# Patient Record
Sex: FEMALE | Race: WHITE | Employment: OTHER | URBAN - METROPOLITAN AREA
[De-identification: names, ages, dates, MRNs, and addresses within clinical notes are randomized per-mention and may not be internally consistent; named-entity substitution may affect disease eponyms.]

---

## 2018-07-09 ENCOUNTER — HOSPITAL ENCOUNTER (OUTPATIENT)
Dept: SURGERY | Age: 73
Discharge: HOME | End: 2018-07-09
Attending: OPHTHALMOLOGY | Admitting: OPHTHALMOLOGY

## 2018-07-09 VITALS — DIASTOLIC BLOOD PRESSURE: 68 MMHG | SYSTOLIC BLOOD PRESSURE: 134 MMHG

## 2018-07-09 RX ORDER — TROPICAMIDE 10 MG/ML
1 SOLUTION/ DROPS OPHTHALMIC ONCE
Status: COMPLETED | OUTPATIENT
Start: 2018-07-09 | End: 2018-07-09

## 2018-07-09 RX ORDER — PROPARACAINE HYDROCHLORIDE 5 MG/ML
1 SOLUTION/ DROPS OPHTHALMIC ONCE
Status: COMPLETED | OUTPATIENT
Start: 2018-07-09 | End: 2018-07-09

## 2018-07-09 RX ADMIN — TROPICAMIDE 1 DROP: 10 SOLUTION/ DROPS OPHTHALMIC at 11:52

## 2018-07-09 RX ADMIN — PROPARACAINE HYDROCHLORIDE 1 DROP: 5 SOLUTION/ DROPS OPHTHALMIC at 11:49

## 2021-03-12 ENCOUNTER — NURSE ONLY (OUTPATIENT)
Dept: PRIMARY CARE CLINIC | Age: 76
End: 2021-03-12
Payer: MEDICARE

## 2021-03-12 DIAGNOSIS — Z23 HIGH PRIORITY FOR COVID-19 VIRUS VACCINATION: Primary | ICD-10-CM

## 2021-03-12 PROCEDURE — 91300 COVID-19, PFIZER VACCINE 30MCG/0.3ML DOSE: CPT | Performed by: NURSE PRACTITIONER

## 2021-03-12 PROCEDURE — 0001A COVID-19, PFIZER VACCINE 30MCG/0.3ML DOSE: CPT | Performed by: NURSE PRACTITIONER

## 2021-04-08 ENCOUNTER — NURSE ONLY (OUTPATIENT)
Dept: PRIMARY CARE CLINIC | Age: 76
End: 2021-04-08

## 2021-04-08 DIAGNOSIS — Z23 HIGH PRIORITY FOR COVID-19 VIRUS VACCINATION: Primary | ICD-10-CM

## 2022-04-16 ENCOUNTER — APPOINTMENT (OUTPATIENT)
Dept: CT IMAGING | Age: 77
End: 2022-04-16
Payer: MEDICARE

## 2022-04-16 ENCOUNTER — HOSPITAL ENCOUNTER (EMERGENCY)
Age: 77
Discharge: HOME | End: 2022-04-16
Payer: MEDICARE

## 2022-04-16 VITALS
RESPIRATION RATE: 18 BRPM | HEIGHT: 66 IN | TEMPERATURE: 98.2 F | HEART RATE: 80 BPM | WEIGHT: 201.06 LBS | SYSTOLIC BLOOD PRESSURE: 135 MMHG | BODY MASS INDEX: 32.31 KG/M2 | DIASTOLIC BLOOD PRESSURE: 70 MMHG | OXYGEN SATURATION: 97 %

## 2022-04-16 DIAGNOSIS — M54.42 CHRONIC LEFT-SIDED LOW BACK PAIN WITH LEFT-SIDED SCIATICA: Primary | ICD-10-CM

## 2022-04-16 DIAGNOSIS — N28.89 LEFT RENAL MASS: ICD-10-CM

## 2022-04-16 DIAGNOSIS — G89.29 CHRONIC LEFT-SIDED LOW BACK PAIN WITH LEFT-SIDED SCIATICA: Primary | ICD-10-CM

## 2022-04-16 LAB
ANION GAP SERPL CALCULATED.3IONS-SCNC: 16 MMOL/L (ref 3–16)
BASOPHILS ABSOLUTE: 0.1 K/UL (ref 0–0.2)
BASOPHILS RELATIVE PERCENT: 0.6 %
BILIRUBIN URINE: NEGATIVE
BLOOD, URINE: ABNORMAL
BUN BLDV-MCNC: 11 MG/DL (ref 7–20)
CALCIUM SERPL-MCNC: 9.6 MG/DL (ref 8.3–10.6)
CHLORIDE BLD-SCNC: 98 MMOL/L (ref 99–110)
CLARITY: CLEAR
CO2: 23 MMOL/L (ref 21–32)
COLOR: YELLOW
CREAT SERPL-MCNC: 0.9 MG/DL (ref 0.6–1.2)
EOSINOPHILS ABSOLUTE: 0 K/UL (ref 0–0.6)
EOSINOPHILS RELATIVE PERCENT: 0.2 %
EPITHELIAL CELLS, UA: 2 /HPF (ref 0–5)
GFR AFRICAN AMERICAN: >60
GFR NON-AFRICAN AMERICAN: >60
GLUCOSE BLD-MCNC: 128 MG/DL (ref 70–99)
GLUCOSE URINE: NEGATIVE MG/DL
HCT VFR BLD CALC: 35.1 % (ref 36–48)
HEMOGLOBIN: 11.9 G/DL (ref 12–16)
HYALINE CASTS: 1 /LPF (ref 0–8)
KETONES, URINE: NEGATIVE MG/DL
LEUKOCYTE ESTERASE, URINE: ABNORMAL
LYMPHOCYTES ABSOLUTE: 0.8 K/UL (ref 1–5.1)
LYMPHOCYTES RELATIVE PERCENT: 6.9 %
MCH RBC QN AUTO: 29.7 PG (ref 26–34)
MCHC RBC AUTO-ENTMCNC: 34 G/DL (ref 31–36)
MCV RBC AUTO: 87.4 FL (ref 80–100)
MICROSCOPIC EXAMINATION: YES
MONOCYTES ABSOLUTE: 0.6 K/UL (ref 0–1.3)
MONOCYTES RELATIVE PERCENT: 5.1 %
NEUTROPHILS ABSOLUTE: 10.1 K/UL (ref 1.7–7.7)
NEUTROPHILS RELATIVE PERCENT: 87.2 %
NITRITE, URINE: NEGATIVE
PDW BLD-RTO: 15.9 % (ref 12.4–15.4)
PH UA: 8.5 (ref 5–8)
PLATELET # BLD: 348 K/UL (ref 135–450)
PMV BLD AUTO: 6.3 FL (ref 5–10.5)
POTASSIUM REFLEX MAGNESIUM: 4.1 MMOL/L (ref 3.5–5.1)
PROTEIN UA: NEGATIVE MG/DL
RBC # BLD: 4.02 M/UL (ref 4–5.2)
RBC UA: 9 /HPF (ref 0–4)
SODIUM BLD-SCNC: 137 MMOL/L (ref 136–145)
SPECIFIC GRAVITY UA: 1.01 (ref 1–1.03)
URINE REFLEX TO CULTURE: ABNORMAL
URINE TYPE: ABNORMAL
UROBILINOGEN, URINE: 1 E.U./DL
WBC # BLD: 11.5 K/UL (ref 4–11)
WBC UA: 2 /HPF (ref 0–5)

## 2022-04-16 PROCEDURE — 81001 URINALYSIS AUTO W/SCOPE: CPT

## 2022-04-16 PROCEDURE — 6370000000 HC RX 637 (ALT 250 FOR IP): Performed by: PHYSICIAN ASSISTANT

## 2022-04-16 PROCEDURE — 74176 CT ABD & PELVIS W/O CONTRAST: CPT

## 2022-04-16 PROCEDURE — 99285 EMERGENCY DEPT VISIT HI MDM: CPT

## 2022-04-16 PROCEDURE — 96372 THER/PROPH/DIAG INJ SC/IM: CPT

## 2022-04-16 PROCEDURE — 85025 COMPLETE CBC W/AUTO DIFF WBC: CPT

## 2022-04-16 PROCEDURE — 80048 BASIC METABOLIC PNL TOTAL CA: CPT

## 2022-04-16 PROCEDURE — 6360000002 HC RX W HCPCS: Performed by: PHYSICIAN ASSISTANT

## 2022-04-16 RX ORDER — DEXAMETHASONE SODIUM PHOSPHATE 4 MG/ML
8 INJECTION, SOLUTION INTRA-ARTICULAR; INTRALESIONAL; INTRAMUSCULAR; INTRAVENOUS; SOFT TISSUE ONCE
Status: COMPLETED | OUTPATIENT
Start: 2022-04-16 | End: 2022-04-16

## 2022-04-16 RX ORDER — IBUPROFEN 200 MG
200 TABLET ORAL EVERY 6 HOURS PRN
COMMUNITY
End: 2022-05-03

## 2022-04-16 RX ORDER — HYDROCODONE BITARTRATE AND ACETAMINOPHEN 5; 325 MG/1; MG/1
1 TABLET ORAL ONCE
Status: COMPLETED | OUTPATIENT
Start: 2022-04-16 | End: 2022-04-16

## 2022-04-16 RX ORDER — PREDNISONE 10 MG/1
TABLET ORAL
Qty: 30 TABLET | Refills: 0 | Status: SHIPPED | OUTPATIENT
Start: 2022-04-16 | End: 2022-04-26

## 2022-04-16 RX ORDER — KETOROLAC TROMETHAMINE 30 MG/ML
30 INJECTION, SOLUTION INTRAMUSCULAR; INTRAVENOUS ONCE
Status: COMPLETED | OUTPATIENT
Start: 2022-04-16 | End: 2022-04-16

## 2022-04-16 RX ORDER — HYDROCODONE BITARTRATE AND ACETAMINOPHEN 5; 325 MG/1; MG/1
1 TABLET ORAL EVERY 6 HOURS PRN
Qty: 10 TABLET | Refills: 0 | Status: SHIPPED | OUTPATIENT
Start: 2022-04-16 | End: 2022-04-21

## 2022-04-16 RX ORDER — METHOCARBAMOL 750 MG/1
750 TABLET, FILM COATED ORAL ONCE
Status: COMPLETED | OUTPATIENT
Start: 2022-04-16 | End: 2022-04-16

## 2022-04-16 RX ADMIN — HYDROCODONE BITARTRATE AND ACETAMINOPHEN 1 TABLET: 5; 325 TABLET ORAL at 13:32

## 2022-04-16 RX ADMIN — METHOCARBAMOL 750 MG: 750 TABLET ORAL at 13:32

## 2022-04-16 RX ADMIN — DEXAMETHASONE SODIUM PHOSPHATE 8 MG: 4 INJECTION, SOLUTION INTRAMUSCULAR; INTRAVENOUS at 13:35

## 2022-04-16 RX ADMIN — KETOROLAC TROMETHAMINE 30 MG: 30 INJECTION, SOLUTION INTRAMUSCULAR at 13:33

## 2022-04-16 ASSESSMENT — PAIN - FUNCTIONAL ASSESSMENT
PAIN_FUNCTIONAL_ASSESSMENT: 0-10
PAIN_FUNCTIONAL_ASSESSMENT: 0-10

## 2022-04-16 ASSESSMENT — PAIN SCALES - GENERAL
PAINLEVEL_OUTOF10: 10
PAINLEVEL_OUTOF10: 10
PAINLEVEL_OUTOF10: 6
PAINLEVEL_OUTOF10: 10

## 2022-04-16 ASSESSMENT — PAIN DESCRIPTION - DESCRIPTORS: DESCRIPTORS: SHARP;SHOOTING

## 2022-04-16 ASSESSMENT — PAIN DESCRIPTION - LOCATION: LOCATION: BACK

## 2022-04-16 ASSESSMENT — PAIN DESCRIPTION - FREQUENCY: FREQUENCY: CONTINUOUS

## 2022-04-16 ASSESSMENT — PAIN DESCRIPTION - ORIENTATION: ORIENTATION: LEFT

## 2022-04-16 NOTE — ED TRIAGE NOTES
Pt arrived EMS. Report given states pt complains of back pain, rates 10/10, for several weeks. All vitals WNL. Pt complains of left sided back pain (starts at lower rib) that shoots down her leg to her knee. Onset 3 weeks ago; with history back to November. States no known injury. No pain with urination. Denies chest pain, SOB, or nausea.

## 2022-04-16 NOTE — ED NOTES
D/C: Order noted for d/c. Pt confirmed d/c paperwork   have correct name. Discharge and education instructions reviewed with patient. Teach-back successful. Pt verbalized understanding and signed d/c papers. Pt denied questions at this time. No acute distress noted. Patient instructed to follow-up as noted - return to emergency department if symptoms worsen. Patient verbalized understanding. Discharged per EDMD with discharge instructions. Pt discharged per self with family to private vehicle. Patient stable upon departure. Thanked patient for choosing [facility]) for care.            Yakov Ryan, RN  04/16/22 6637

## 2022-04-16 NOTE — ED PROVIDER NOTES
[address] [facility]        Pt Name: Marquis Dotson  MRN: [id number]  [place] 1945  Date of evaluation: 4/16/2022  Provider: MARAH Hudson  PCP: No primary care provider on file. Note Started: 3:41 PM EDT     The ED Attending Physician was available for consultation but did not see or evaluate this patient. CHIEF COMPLAINT       Chief Complaint   Patient presents with    Back Pain     Left sided back pain. Shooting for lower rib to knee. HISTORY OF PRESENT ILLNESS   (Location, Timing/Onset, Context/Setting, Quality, Duration, Modifying Factors, Severity, Associated Signs and Symptoms)  Note limiting factors. Chief Complaint: Pain in the left mid back extending down the left leg posteriorly    Marquis Dotson is a 77 y.o. female who presents reporting chronic pain in her left side from the mid back down the back of the leg to below the knee. Says she been having this for months, but today is worse than usual.  Is not taking any medications for this at home presently. Says she had an MRI recently that showed something on her kidney, and she also got an injection in her spine for pain relief but this did not help. She denies any traumatic injury. She says the pain today makes it so that she can barely walk, she denies any numbness presently. Says she is able to move the leg but it is painful. Denies any abdominal pain or nausea. Nursing Notes were all reviewed and agreed with or any disagreements were addressed in the HPI. REVIEW OF SYSTEMS    (2-9 systems for level 4, 10 or more for level 5)     Review of Systems    Positives and pertinent negatives as per HPI. PAST MEDICAL HISTORY   No past medical history on file.     SURGICAL HISTORY     Past Surgical History:   Procedure Laterality Date    APPENDECTOMY      CHOLECYSTECTOMY         CURRENTMEDICATIONS       Previous Medications    IBUPROFEN (ADVIL;MOTRIN) 200 MG TABLET    Take 200 mg by mouth every 6 hours as needed for Pain       ALLERGIES     Penicillins and Ropinirole    FAMILYHISTORY     No family history on file. SOCIAL HISTORY       Social History     Tobacco Use    Smoking status: Never Smoker    Smokeless tobacco: Never Used   Substance Use Topics    Alcohol use: Not on file    Drug use: Not on file       SCREENINGS    [place] Coma Scale  Eye Opening: Spontaneous  Best Verbal Response: Oriented  Best Motor Response: Obeys commands  [place] Coma Scale Score: 15      PHYSICAL EXAM    (up to 7 for level 4, 8 or more for level 5)     ED Triage Vitals [04/16/22 1155]   BP Temp Temp Source Pulse Resp SpO2 Height Weight   131/63 98.1 °F (36.7 °C) Oral 84 18 98 % 5' 6\" (1.676 m) 201 lb 1 oz (91.2 kg)       Physical Exam  Vitals and nursing note reviewed. Constitutional:       General: She is not in acute distress. Appearance: Normal appearance. She is not ill-appearing. HENT:      Head: Normocephalic and atraumatic. Nose: Nose normal.   Eyes:      General:         Right eye: No discharge. Left eye: No discharge. Pulmonary:      Effort: Pulmonary effort is normal. No respiratory distress. Musculoskeletal:         General: Normal range of motion. Cervical back: Normal range of motion and neck supple. No rigidity or bony tenderness. No spinous process tenderness. Normal range of motion. Lumbar back: No signs of trauma or bony tenderness. Skin:     General: Skin is warm and dry. Neurological:      General: No focal deficit present. Mental Status: She is alert and oriented to person, place, and time. Motor: Motor function is intact. No weakness or abnormal muscle tone. Coordination: Coordination is intact.       Gait: Gait normal.   Psychiatric:         Mood and Affect: Mood normal.         Behavior: Behavior normal.         DIAGNOSTIC RESULTS   LABS:    Labs Reviewed   URINALYSIS WITH REFLEX TO CULTURE - Abnormal; Notable for the following components:       Result Value    Blood, Urine TRACE-INTACT (*)     pH, UA 8.5 (*)     Leukocyte Esterase, Urine TRACE (*)     All other components within normal limits   MICROSCOPIC URINALYSIS - Abnormal; Notable for the following components:    RBC, UA 9 (*)     All other components within normal limits   BASIC METABOLIC PANEL W/ REFLEX TO MG FOR LOW K - Abnormal; Notable for the following components:    Chloride 98 (*)     Glucose 128 (*)     All other components within normal limits   CBC WITH AUTO DIFFERENTIAL - Abnormal; Notable for the following components:    WBC 11.5 (*)     Hemoglobin 11.9 (*)     Hematocrit 35.1 (*)     RDW 15.9 (*)     Neutrophils Absolute 10.1 (*)     Lymphocytes Absolute 0.8 (*)     All other components within normal limits       When ordered only abnormal lab results are displayed. All other labs were within normal range or not returned as of this dictation. EKG: When ordered, EKG's are interpreted by the Emergency Department Physician in the absence of a cardiologist.  Please see their note for interpretation of EKG. RADIOLOGY:   Non-plain film images such as CT, Ultrasound and MRI are read by the radiologist. Plain radiographic images are visualized and preliminarily interpreted by the ED Provider with the below findings:    Interpretation per the Radiologist below, if available at the time of this note:    CT ABDOMEN PELVIS WO CONTRAST Additional Contrast? None   Final Result      1.  5.6 cm somewhat exophytic mass arising laterally from the mid pole of the   left kidney. This has slightly higher density than a typical renal cyst at   23 Hounsfield units, favoring a complex renal cyst.  However given the   patient's history of hematuria it may be prudent to confirm its nature with a   scheduled renal ultrasound. 2.  No evidence of urinary tract calculi or obstructive uropathy or any other   definite etiology for the patient's hematuria.       3. Otherwise no acute pathology noted. CONSULTS:  None    PROCEDURES   Unless otherwise noted below, none. Procedures    EMERGENCY DEPARTMENT COURSE and DIFFERENTIAL DIAGNOSIS/MDM:   Vitals:    Vitals:    04/16/22 1300 04/16/22 1343 04/16/22 1358 04/16/22 1413   BP: 135/65 130/70 129/68 133/64   Pulse:       Resp:       Temp:       TempSrc:       SpO2: 95% 96% 97% 98%   Weight:       Height:           Patient was given the following medications:  Medications   dexamethasone (DECADRON) injection 8 mg (8 mg IntraMUSCular Given 4/16/22 1335)   ketorolac (TORADOL) injection 30 mg (30 mg IntraMUSCular Given 4/16/22 1333)   HYDROcodone-acetaminophen (NORCO) 5-325 MG per tablet 1 tablet (1 tablet Oral Given 4/16/22 1332)   methocarbamol (ROBAXIN) tablet 750 mg (750 mg Oral Given 4/16/22 1332)           Patient's urinalysis shows some blood but no infection. As result, sebacic labs and a CT scan of the abdomen and pelvis without contrast were performed. Lab work-up was essentially normal, including normal kidney function, and a CT scan showed a mass in the left kidney, likely a complex cyst, but no other acute findings. Patient was given medications in the ED and reported some improvement. She was able to ambulate. Patient's pain is likely musculoskeletal, possible sciatic component, and there is no indication for hospitalization or further work-up. I discussed the renal findings on CT with the patient, and she says she is presently looking for a new primary care provider. She will be given a referral for primary care follow-up and I urged the patient to make an appointment soon as possible to obtain a renal ultrasound. Patient will also be prescribed a course of steroids and a short supply of pain medication. The patient verbalized understanding and agreement with this plan of care.  The patient was advised to return to the emergency department if symptoms should significantly worsen or if new and concerning symptoms should appear. I estimate there is LOW risk for ABDOMINAL AORTIC ANEURYSM, SPINAL MENINGITIS, EPIDURAL ABSCESS, CAUDA EQUINA SYNDROME, EPIDURAL MASS LESION, or CORD COMPRESSION, thus I consider the discharge disposition reasonable. CRITICAL CARE TIME   None    FINAL IMPRESSION      1. Chronic left-sided low back pain with left-sided sciatica    2. Left renal mass          DISPOSITION/PLAN   DISPOSITION Decision To Discharge 04/16/2022 03:25:10 PM      PATIENT REFERRED TO:  Latanya Cavazos  [phone]  Schedule an appointment as soon as possible for a visit   for primary care follow-up      DISCHARGE MEDICATIONS:  New Prescriptions    HYDROCODONE-ACETAMINOPHEN (NORCO) 5-325 MG PER TABLET    Take 1 tablet by mouth every 6 hours as needed for Pain for up to 5 days. PREDNISONE (DELTASONE) 10 MG TABLET    Take 4 tablets by mouth for 3 days, then 3, 2, 1 tablets daily for 2 days each.        DISCONTINUED MEDICATIONS:  Discontinued Medications    No medications on file            (Please note that portions of this note were completed with a voice recognition program.  Efforts were made to edit the dictations but occasionally words are mis-transcribed.)    MARAH Tabares (electronically signed)       Layne Tabares  04/16/22 1545

## 2022-05-03 ENCOUNTER — OFFICE VISIT (OUTPATIENT)
Dept: FAMILY MEDICINE CLINIC | Age: 77
End: 2022-05-03

## 2022-05-03 VITALS
SYSTOLIC BLOOD PRESSURE: 110 MMHG | HEIGHT: 66 IN | HEART RATE: 89 BPM | BODY MASS INDEX: 31.5 KG/M2 | WEIGHT: 196 LBS | DIASTOLIC BLOOD PRESSURE: 67 MMHG | TEMPERATURE: 98.8 F | OXYGEN SATURATION: 97 %

## 2022-05-03 DIAGNOSIS — R29.898 WEAKNESS OF BOTH LOWER EXTREMITIES: ICD-10-CM

## 2022-05-03 DIAGNOSIS — M79.605 BILATERAL LEG PAIN: ICD-10-CM

## 2022-05-03 DIAGNOSIS — M25.562 PAIN IN BOTH KNEES, UNSPECIFIED CHRONICITY: Primary | ICD-10-CM

## 2022-05-03 DIAGNOSIS — R25.2 CRAMPING OF HANDS: ICD-10-CM

## 2022-05-03 DIAGNOSIS — M79.604 BILATERAL LEG PAIN: ICD-10-CM

## 2022-05-03 DIAGNOSIS — M25.561 PAIN IN BOTH KNEES, UNSPECIFIED CHRONICITY: Primary | ICD-10-CM

## 2022-05-03 DIAGNOSIS — R73.01 ELEVATED FASTING BLOOD SUGAR: ICD-10-CM

## 2022-05-03 DIAGNOSIS — M54.59 OTHER LOW BACK PAIN: ICD-10-CM

## 2022-05-03 DIAGNOSIS — N28.1 CYST OF LEFT KIDNEY: ICD-10-CM

## 2022-05-03 PROBLEM — I24.9 ACS (ACUTE CORONARY SYNDROME) (HCC): Status: ACTIVE | Noted: 2021-08-11

## 2022-05-03 PROBLEM — R94.31 ABNORMAL EKG: Status: ACTIVE | Noted: 2021-08-11

## 2022-05-03 PROBLEM — R94.39 ABNORMAL NUCLEAR STRESS TEST: Status: ACTIVE | Noted: 2021-08-11

## 2022-05-03 PROBLEM — I20.9 ANGINA PECTORIS WITH NORMAL CORONARY ARTERIOGRAM (HCC): Status: ACTIVE | Noted: 2021-08-16

## 2022-05-03 PROBLEM — R06.02 SHORTNESS OF BREATH: Status: ACTIVE | Noted: 2021-08-11

## 2022-05-03 PROBLEM — E66.9 OBESITY (BMI 30.0-34.9): Status: ACTIVE | Noted: 2021-08-16

## 2022-05-03 PROBLEM — I44.7 LBBB (LEFT BUNDLE BRANCH BLOCK): Status: ACTIVE | Noted: 2021-08-11

## 2022-05-03 LAB
C-REACTIVE PROTEIN: 160.1 MG/L (ref 0–5.1)
RHEUMATOID FACTOR: 16 IU/ML
TOTAL CK: 28 U/L (ref 26–192)
URIC ACID, SERUM: 4.8 MG/DL (ref 2.6–6)

## 2022-05-03 RX ORDER — BACLOFEN 10 MG/1
10 TABLET ORAL 2 TIMES DAILY PRN
Qty: 60 TABLET | Refills: 0 | Status: SHIPPED | OUTPATIENT
Start: 2022-05-03 | End: 2022-09-08 | Stop reason: ALTCHOICE

## 2022-05-03 RX ORDER — HYDROCODONE BITARTRATE AND ACETAMINOPHEN 5; 325 MG/1; MG/1
1 TABLET ORAL EVERY 6 HOURS PRN
COMMUNITY
End: 2022-09-08 | Stop reason: ALTCHOICE

## 2022-05-03 RX ORDER — DICLOFENAC SODIUM 75 MG/1
75 TABLET, DELAYED RELEASE ORAL 2 TIMES DAILY PRN
Qty: 60 TABLET | Refills: 0 | Status: SHIPPED | OUTPATIENT
Start: 2022-05-03 | End: 2022-09-08 | Stop reason: ALTCHOICE

## 2022-05-03 ASSESSMENT — PATIENT HEALTH QUESTIONNAIRE - PHQ9
2. FEELING DOWN, DEPRESSED OR HOPELESS: 0
SUM OF ALL RESPONSES TO PHQ QUESTIONS 1-9: 0
1. LITTLE INTEREST OR PLEASURE IN DOING THINGS: 0
SUM OF ALL RESPONSES TO PHQ QUESTIONS 1-9: 0
SUM OF ALL RESPONSES TO PHQ9 QUESTIONS 1 & 2: 0

## 2022-05-03 NOTE — PROGRESS NOTES
A/P:    Diagnosis Orders   1. Pain in both knees, unspecified chronicity  Sedimentation Rate    C-Reactive Protein    RHEUMATOID FACTOR    EDINSON Reflex to Antibody [place]    Uric Acid   2. Other low back pain  Sedimentation Rate    C-Reactive Protein    RHEUMATOID FACTOR    EDINSON Reflex to Antibody Cascade   3. Bilateral leg pain  Sedimentation Rate    C-Reactive Protein    RHEUMATOID FACTOR    EDINSON Reflex to Antibody Cascade    XR CERVICAL SPINE (4-5 VIEWS)    CK   4. Elevated fasting blood sugar  Hemoglobin A1C   5. Cramping of hands  XR CERVICAL SPINE (4-5 VIEWS)    CK   6. Weakness of both lower extremities  XR CERVICAL SPINE (4-5 VIEWS)    CK   7. Cyst of left kidney  US RENAL COMPLETE     I do have some concern for cervical pathology causing her leg symptoms since her symptoms did not really improve with epidural steroid injection and her hand cramping. I have ordered x-ray of cervical spine. I have prescribed her diclofenac and baclofen to try for her discomfort. For her renal cyst, I have ordered high-priority renal ultrasound. She agrees to get c-spine x-ray on the day of ultrasound    Will request records from [facility]    For her elevated fasting sugar, will check an A1c today. She will consider pap smear, colon cancer screening, mammogram.    Will arrange follow-up based on results and clinical response. O:   Vitals:    05/03/22 1106   BP: 110/67   Pulse: 89   Temp: 98.8 °F (37.1 °C)   SpO2: 97%     Gen- NAD, pleasant  HEENT- Eyes without icterus or injection, throat and tms unremarkable  Neck- Supple, no lymphadenopathy appreciated  Lungs- CTAB  Heart- RRR  Abd- Soft, non tender  Ext-right knee is a bit swollen and warm, there is no erythema  Psych- Appropriate  Neuromuscular-her lumbar spine is mildly tender to palpation, her hamstrings are tight, left worse than right, straight leg raise negative, she has ambulatory dysfunction    S: CC-establish care  HPI-Ms. Bunch presents to establish care. She reports starting with low back pain going down into the legs and swelling, pain of knees in past 3-6 months. She has seen [facility] for her symptoms. She was told she had pseudogout of right knee and had steroid injection. She notes that the knee continues to swell and be warm. She saw back specialist after symptoms did not improve with knee injection and was given an epidural steroid injection after MRI. She notes 2 to 3 days of relief with this injection. She reports bilateral hand cramping. She notes that her legs feel weak as well. She denies any neck pain. She was found to have a 5.6 cm exophytic left renal cyst seen on CT in the ER. She had microscopic hematuria at that time. She denies any urinary symptoms. She reports it has been many years since she had a Pap smear. She has never had colon cancer screening. ROS  Denies fever, chills, night sweats  Denies headaches, sore throat, ear pain  Denies chest pain, dyspnea, palpitations  Denies nausea, vomiting, diarrhea  Denies joint pain  Denies depression, anxiety  Denies rashes    Current Outpatient Medications   Medication Sig Dispense Refill    HYDROcodone-acetaminophen (NORCO) 5-325 MG per tablet Take 1 tablet by mouth every 6 hours as needed for Pain.  diclofenac (VOLTAREN) 75 MG EC tablet Take 1 tablet by mouth 2 times daily as needed for Pain (take with food) 60 tablet 0    baclofen (LIORESAL) 10 MG tablet Take 1 tablet by mouth 2 times daily as needed (muscle pain and tightness) 60 tablet 0     No current facility-administered medications for this visit.         Allergies   Allergen Reactions    Penicillins Hives    Ropinirole Other (See Comments)     Insomnia; constipation        Past Medical History:   Diagnosis Date    Cyst of left kidney     Pseudogout     Scoliosis     Spinal arthritis         Past Surgical History:   Procedure Laterality Date    APPENDECTOMY      CHOLECYSTECTOMY Social History     Social History Narrative    , 2 children, from area, likes to shop        Family History   Problem Relation Age of Onset    Hypertension Father           Gilma Moore DO

## 2022-05-04 LAB
ANTI-NUCLEAR ANTIBODY (ANA): NEGATIVE
ESTIMATED AVERAGE GLUCOSE: 116.9 MG/DL
HBA1C MFR BLD: 5.7 %
SEDIMENTATION RATE, ERYTHROCYTE: 114 MM/HR (ref 0–30)

## 2022-05-06 ENCOUNTER — HOSPITAL ENCOUNTER (OUTPATIENT)
Age: 77
Discharge: HOME | End: 2022-05-06
Payer: MEDICARE

## 2022-05-06 ENCOUNTER — HOSPITAL ENCOUNTER (OUTPATIENT)
Dept: ULTRASOUND IMAGING | Age: 77
Discharge: HOME | End: 2022-05-06
Payer: MEDICARE

## 2022-05-06 ENCOUNTER — HOSPITAL ENCOUNTER (OUTPATIENT)
Dept: GENERAL RADIOLOGY | Age: 77
Discharge: HOME | End: 2022-05-06
Payer: MEDICARE

## 2022-05-06 DIAGNOSIS — R29.898 WEAKNESS OF BOTH LOWER EXTREMITIES: ICD-10-CM

## 2022-05-06 DIAGNOSIS — R25.2 CRAMPING OF HANDS: ICD-10-CM

## 2022-05-06 DIAGNOSIS — M79.605 BILATERAL LEG PAIN: ICD-10-CM

## 2022-05-06 DIAGNOSIS — M79.604 BILATERAL LEG PAIN: ICD-10-CM

## 2022-05-06 DIAGNOSIS — N28.1 CYST OF LEFT KIDNEY: ICD-10-CM

## 2022-05-06 PROCEDURE — 72050 X-RAY EXAM NECK SPINE 4/5VWS: CPT

## 2022-05-06 PROCEDURE — 76770 US EXAM ABDO BACK WALL COMP: CPT

## 2022-05-10 ENCOUNTER — TELEPHONE (OUTPATIENT)
Dept: FAMILY MEDICINE CLINIC | Age: 77
End: 2022-05-10

## 2022-05-10 DIAGNOSIS — R31.29 MICROSCOPIC HEMATURIA: Primary | ICD-10-CM

## 2022-05-10 DIAGNOSIS — N28.1 RENAL CYST: ICD-10-CM

## 2022-05-10 DIAGNOSIS — R79.82 CRP ELEVATED: ICD-10-CM

## 2022-05-10 DIAGNOSIS — R70.0 ELEVATED SED RATE: ICD-10-CM

## 2022-05-10 DIAGNOSIS — R76.8 RHEUMATOID FACTOR POSITIVE: ICD-10-CM

## 2022-05-10 DIAGNOSIS — D72.829 LEUKOCYTOSIS, UNSPECIFIED TYPE: ICD-10-CM

## 2022-05-10 NOTE — TELEPHONE ENCOUNTER
Please let patient know I have call into Dr. Edilberto Burgos. I would like to recheck her swelling markers, white blood cell count tomorrow. I would also like to do a follow-up test for her positive rheumatoid factor result. Rheumatoid factor can be elevated with numerous conditions. Have ordered a specific lab for rheumatoid arthritis.

## 2022-05-10 NOTE — TELEPHONE ENCOUNTER
I spoke with patient about lab and imaging results. She reports she is feeling quite a bit better. She notes that baclofen does cause her some dizziness. She will try taking a half a tablet of baclofen to see if this continues to help her symptoms and makes the dizziness resolve. She is to let me know if it does not. We discussed her lab results showing very significant elevations in her CRP and sed rate. She reports that her epidural injection was about 2 months ago by Dr. Zamzam Enriquez. At that point, she was having low back pain and pain down her right leg. After the reported epidural, she developed pain and weakness of both legs. I let her know that I would get a hold of his office to let him know about her lab results and symptoms. She agrees to be evaluated in the ER with worsening symptoms. I contacted Dr. Mikala Raymond office and spoke with his MA about patient's symptoms and lab results. He is currently in surgery. She told me that arthritic findings and scoliosis were seen on MRI. She will have office notes and results faxed to office. She also reports that patient was given an SI joint injection not epidural injection and procedure was done on April 1st.    Her ultrasound looked reassuring in regards to her kidney cyst.  With her microscopic hematuria and this finding, she is agreeable to urology referral.  Urology referral ordered.

## 2022-05-11 ENCOUNTER — NURSE ONLY (OUTPATIENT)
Dept: FAMILY MEDICINE CLINIC | Age: 77
End: 2022-05-11
Payer: MEDICARE

## 2022-05-11 DIAGNOSIS — R70.0 ELEVATED SED RATE: ICD-10-CM

## 2022-05-11 DIAGNOSIS — D72.829 LEUKOCYTOSIS, UNSPECIFIED TYPE: ICD-10-CM

## 2022-05-11 DIAGNOSIS — R76.8 RHEUMATOID FACTOR POSITIVE: ICD-10-CM

## 2022-05-11 DIAGNOSIS — R79.82 CRP ELEVATED: ICD-10-CM

## 2022-05-11 LAB
BASOPHILS ABSOLUTE: 0 K/UL (ref 0–0.2)
BASOPHILS RELATIVE PERCENT: 0.6 %
C-REACTIVE PROTEIN: 30.7 MG/L (ref 0–5.1)
EOSINOPHILS ABSOLUTE: 0.2 K/UL (ref 0–0.6)
EOSINOPHILS RELATIVE PERCENT: 3.1 %
HCT VFR BLD CALC: 33.4 % (ref 36–48)
HEMOGLOBIN: 11.2 G/DL (ref 12–16)
LYMPHOCYTES ABSOLUTE: 2.1 K/UL (ref 1–5.1)
LYMPHOCYTES RELATIVE PERCENT: 29 %
MCH RBC QN AUTO: 30.1 PG (ref 26–34)
MCHC RBC AUTO-ENTMCNC: 33.6 G/DL (ref 31–36)
MCV RBC AUTO: 89.6 FL (ref 80–100)
MONOCYTES ABSOLUTE: 0.6 K/UL (ref 0–1.3)
MONOCYTES RELATIVE PERCENT: 8.9 %
NEUTROPHILS ABSOLUTE: 4.2 K/UL (ref 1.7–7.7)
NEUTROPHILS RELATIVE PERCENT: 58.4 %
PDW BLD-RTO: 16.9 % (ref 12.4–15.4)
PLATELET # BLD: 384 K/UL (ref 135–450)
PMV BLD AUTO: 6.4 FL (ref 5–10.5)
RBC # BLD: 3.73 M/UL (ref 4–5.2)
SEDIMENTATION RATE, ERYTHROCYTE: 105 MM/HR (ref 0–30)
WBC # BLD: 7.2 K/UL (ref 4–11)

## 2022-05-11 PROCEDURE — 36415 COLL VENOUS BLD VENIPUNCTURE: CPT | Performed by: FAMILY MEDICINE

## 2022-05-11 NOTE — TELEPHONE ENCOUNTER
I spoke with pt yesterday. I did receive call from Dr. Pedro Diaz this morning and his office is going to reach out to her today to schedule appt so she can be re-examined.

## 2022-05-12 LAB — CYCLIC CITRULLINATED PEPTIDE ANTIBODY IGG: <0.5 U/ML (ref 0–2.9)

## 2022-05-13 ENCOUNTER — TELEPHONE (OUTPATIENT)
Dept: FAMILY MEDICINE CLINIC | Age: 77
End: 2022-05-13

## 2022-05-13 NOTE — TELEPHONE ENCOUNTER
----- Message from Carlos A Cee sent at 5/13/2022  1:06 PM EDT -----  Subject: Message to Provider    QUESTIONS  Information for Provider? Patient is calling To let the PCP know that she   could not get into see Dr. Myriam Reardon until May 20th . She has it set for May   20th. She could not get in Monday or Tuesday.   ---------------------------------------------------------------------------  --------------  CALL BACK INFO  What is the best way for the office to contact you? OK to leave message on   voicemail  Preferred Call Back Phone Number? [phone]  ---------------------------------------------------------------------------  --------------  SCRIPT ANSWERS  Relationship to Patient?  Self

## 2022-05-13 NOTE — TELEPHONE ENCOUNTER
I spoke with patient about lab results. Her CRP is significantly improved. She reports her symptoms continue to improve. She was holding off on scheduling a follow-up appointment with Dr. Mely Stewart until she got lab results back. She agrees to call Dr. Naheed Carney office today to schedule follow-up appointment. She is to let me know if he is unable to see her early next week. She agrees to update me after appointment with him. She is to call with any concerns.

## 2022-09-08 ENCOUNTER — OFFICE VISIT (OUTPATIENT)
Dept: FAMILY MEDICINE CLINIC | Age: 77
End: 2022-09-08
Payer: MEDICARE

## 2022-09-08 VITALS — HEIGHT: 64 IN | WEIGHT: 200 LBS | BODY MASS INDEX: 34.15 KG/M2

## 2022-09-08 DIAGNOSIS — R70.0 ELEVATED SED RATE: ICD-10-CM

## 2022-09-08 DIAGNOSIS — R73.03 PREDIABETES: ICD-10-CM

## 2022-09-08 DIAGNOSIS — Z00.00 MEDICARE ANNUAL WELLNESS VISIT, SUBSEQUENT: Primary | ICD-10-CM

## 2022-09-08 DIAGNOSIS — R79.82 CRP ELEVATED: ICD-10-CM

## 2022-09-08 LAB — SEDIMENTATION RATE, ERYTHROCYTE: 5 MM/HR (ref 0–30)

## 2022-09-08 PROCEDURE — G0439 PPPS, SUBSEQ VISIT: HCPCS | Performed by: FAMILY MEDICINE

## 2022-09-08 PROCEDURE — 36415 COLL VENOUS BLD VENIPUNCTURE: CPT | Performed by: FAMILY MEDICINE

## 2022-09-08 PROCEDURE — 1123F ACP DISCUSS/DSCN MKR DOCD: CPT | Performed by: FAMILY MEDICINE

## 2022-09-08 RX ORDER — IBUPROFEN 200 MG
200 TABLET ORAL EVERY 6 HOURS PRN
COMMUNITY

## 2022-09-08 ASSESSMENT — PATIENT HEALTH QUESTIONNAIRE - PHQ9
2. FEELING DOWN, DEPRESSED OR HOPELESS: 0
SUM OF ALL RESPONSES TO PHQ9 QUESTIONS 1 & 2: 0
1. LITTLE INTEREST OR PLEASURE IN DOING THINGS: 0
SUM OF ALL RESPONSES TO PHQ QUESTIONS 1-9: 0

## 2022-09-08 ASSESSMENT — LIFESTYLE VARIABLES
HOW MANY STANDARD DRINKS CONTAINING ALCOHOL DO YOU HAVE ON A TYPICAL DAY: 1 OR 2
HOW OFTEN DO YOU HAVE A DRINK CONTAINING ALCOHOL: 2-3 TIMES A WEEK

## 2022-09-08 NOTE — PROGRESS NOTES
Medicare Annual Wellness Visit    Zaid Palomino is here for Medicare AWV (No concerns)    Assessment & Plan   Medicare annual wellness visit, subsequent  Elevated sed rate  -     Sedimentation Rate  CRP elevated  -     C-Reactive Protein  Prediabetes  -     Hemoglobin A1C      Recommendations for Preventive Services Due: see orders and patient instructions/AVS.  Recommended screening schedule for the next 5-10 years is provided to the patient in written form: see Patient Instructions/AVS.    Patient declines flu, pneumococcal, COVID, Tdap vaccines    Patient declines DEXA scan, colon cancer screening, mammogram     Return in about 9 months (around 6/8/2023) for check up. Subjective     Patient reports she is doing well. She had 6 lumbar injections in July. She had been referred from Dr. Phyllis Lester to a different specialist with [facility]. She reports her pain is significantly improved. She says it is 50 to 60% improved. She is very happy with her progress. She has no concerns today. Patient's complete Health Risk Assessment and screening values have been reviewed and are found in Flowsheets. The following problems were reviewed today and where indicated follow up appointments were made and/or referrals ordered.     Positive Risk Factor Screenings with Interventions:             General Health and ACP:  General  In general, how would you say your health is?: Very Good  In the past 7 days, have you experienced any of the following: New or Increased Pain, New or Increased Fatigue, Loneliness, Social Isolation, Stress or Anger?: No  Do you get the social and emotional support that you need?: Yes  Do you have a Living Will?: Yes    Advance Directives       Power of  Living Will ACP-Advance Directive ACP-Power of     Not on File Not on File Not on File Not on File            General:  Importance of advanced directives discussed  Recommended removing any fall hazards  Continued healthy eating, exercise recommended  Patient reports she has all she needs and is able to do ADLs without any issues  Patient denies any mood, memory concerns  Eye doctor and dentist follow-up encouraged      Health Habits/Nutrition:  Physical Activity: Inactive    Days of Exercise per Week: 0 days    Minutes of Exercise per Session: 0 min     Have you lost any weight without trying in the past 3 months?: No  Body mass index: (!) 34.33  Have you seen the dentist within the past year?: Yes    Hearing/Vision:  Do you or your family notice any trouble with your hearing that hasn't been managed with hearing aids?: No  Do you have difficulty driving, watching TV, or doing any of your daily activities because of your eyesight?: No  Have you had an eye exam within the past year?: (!) No  No results found. Objective   Vitals:    09/08/22 1440   Weight: 200 lb (90.7 kg)   Height: 5' 4\" (1.626 m)      Body mass index is 34.33 kg/m². Gen- NAD, pleasant  HEENT- Eyes without icterus or injection  Neck- Supple, no lymphadenopathy appreciated  Lungs- CTAB  Heart- RRR  Abd- Soft, non tender  Ext- No edema  Psych- Appropriate       Allergies   Allergen Reactions    Penicillins Hives    Ropinirole Other (See Comments)     Insomnia; constipation     Prior to Visit Medications    Medication Sig Taking?  Authorizing Provider   ibuprofen (ADVIL;MOTRIN) 200 MG tablet Take 200 mg by mouth every 6 hours as needed for Pain Yes Historical Provider, MD Pérez (Including outside providers/suppliers regularly involved in providing care):   Patient Care Team:  Mal Aschoff, DO as PCP - General (Family Medicine)  Mal Aschoff, DO as PCP - REHABILITATION [facility] Empaneled Provider  Tita Stewart MD as Consulting Physician (Physical Medicine and Rehab)     Reviewed and updated this visit:  Tobacco  Allergies  Meds  Med Hx  Surg Hx  Soc Hx  Fam Hx

## 2022-09-09 LAB
C-REACTIVE PROTEIN: 18.1 MG/L (ref 0–5.1)
ESTIMATED AVERAGE GLUCOSE: 114 MG/DL
HBA1C MFR BLD: 5.6 %

## 2022-09-22 ENCOUNTER — TELEPHONE (OUTPATIENT)
Dept: FAMILY MEDICINE CLINIC | Age: 77
End: 2022-09-22

## 2022-09-22 NOTE — TELEPHONE ENCOUNTER
Called and left a VM on [phone] and advised of message below. Instructed to call back with questions or concerns.

## 2022-12-20 ENCOUNTER — TELEMEDICINE (OUTPATIENT)
Dept: FAMILY MEDICINE CLINIC | Age: 77
End: 2022-12-20
Payer: MEDICARE

## 2022-12-20 DIAGNOSIS — J20.9 ACUTE BRONCHITIS, UNSPECIFIED ORGANISM: Primary | ICD-10-CM

## 2022-12-20 PROCEDURE — 99213 OFFICE O/P EST LOW 20 MIN: CPT | Performed by: FAMILY MEDICINE

## 2022-12-20 PROCEDURE — 1123F ACP DISCUSS/DSCN MKR DOCD: CPT | Performed by: FAMILY MEDICINE

## 2022-12-20 RX ORDER — BENZONATATE 100 MG/1
100 CAPSULE ORAL 3 TIMES DAILY PRN
Qty: 30 CAPSULE | Refills: 0 | Status: SHIPPED | OUTPATIENT
Start: 2022-12-20 | End: 2023-12-20

## 2022-12-20 RX ORDER — AZITHROMYCIN 250 MG/1
250 TABLET, FILM COATED ORAL SEE ADMIN INSTRUCTIONS
Qty: 6 TABLET | Refills: 0 | Status: SHIPPED | OUTPATIENT
Start: 2022-12-20 | End: 2022-12-25

## 2022-12-20 RX ORDER — GUAIFENESIN 600 MG/1
1200 TABLET, EXTENDED RELEASE ORAL 2 TIMES DAILY
COMMUNITY

## 2022-12-20 RX ORDER — UREA 10 %
1 LOTION (ML) TOPICAL DAILY
Qty: 30 TABLET | Refills: 0 | Status: SHIPPED | OUTPATIENT
Start: 2022-12-20 | End: 2023-01-19

## 2022-12-20 SDOH — ECONOMIC STABILITY: FOOD INSECURITY: WITHIN THE PAST 12 MONTHS, THE FOOD YOU BOUGHT JUST DIDN'T LAST AND YOU DIDN'T HAVE MONEY TO GET MORE.: NEVER TRUE

## 2022-12-20 SDOH — ECONOMIC STABILITY: FOOD INSECURITY: WITHIN THE PAST 12 MONTHS, YOU WORRIED THAT YOUR FOOD WOULD RUN OUT BEFORE YOU GOT MONEY TO BUY MORE.: NEVER TRUE

## 2022-12-20 ASSESSMENT — PATIENT HEALTH QUESTIONNAIRE - PHQ9
1. LITTLE INTEREST OR PLEASURE IN DOING THINGS: 0
SUM OF ALL RESPONSES TO PHQ QUESTIONS 1-9: 0
SUM OF ALL RESPONSES TO PHQ QUESTIONS 1-9: 0
SUM OF ALL RESPONSES TO PHQ9 QUESTIONS 1 & 2: 0
SUM OF ALL RESPONSES TO PHQ QUESTIONS 1-9: 0
2. FEELING DOWN, DEPRESSED OR HOPELESS: 0
SUM OF ALL RESPONSES TO PHQ QUESTIONS 1-9: 0

## 2022-12-20 ASSESSMENT — SOCIAL DETERMINANTS OF HEALTH (SDOH): HOW HARD IS IT FOR YOU TO PAY FOR THE VERY BASICS LIKE FOOD, HOUSING, MEDICAL CARE, AND HEATING?: SOMEWHAT HARD

## 2022-12-20 NOTE — PROGRESS NOTES
Appointment was done audiovisually. Patient gave consent for an audiovisual visit. Patient was in their state [place] residency[place] at time of visit. Parties involved in visit were myself, nurse, and patient. A/P:    Diagnosis Orders   1. Acute bronchitis, unspecified organism  azithromycin (ZITHROMAX) 250 MG tablet        Z-Javier plus supportive care with Tessalon Perles prescribed, she is to call or be evaluated with worsening symptoms, she is to let me know if her symptoms do not improve/resolve      O: There were no vitals taken for this visit. Gen- NAD, pleasant  HEENT- Eyes without icterus or injection, nasal congestion appreciated  Lungs- no increased work of breathing appreciated  Psych- Appropriate    S: CC-cold symptoms  HPI-patient reports cough for about 2 and 1/2 weeks. She denies chest pain, dyspnea, fever, sore throat. She does have some bilateral ear discomfort. Her symptoms are not improving. She did not test for COVID.     ROS- Per HPI    Patient's medications, allergies, and past medical hx were reviewed

## 2023-04-19 ENCOUNTER — OFFICE VISIT (OUTPATIENT)
Dept: FAMILY MEDICINE CLINIC | Age: 78
End: 2023-04-19
Payer: MEDICARE

## 2023-04-19 VITALS
HEIGHT: 64 IN | DIASTOLIC BLOOD PRESSURE: 82 MMHG | OXYGEN SATURATION: 97 % | WEIGHT: 204 LBS | SYSTOLIC BLOOD PRESSURE: 128 MMHG | HEART RATE: 75 BPM | BODY MASS INDEX: 34.83 KG/M2

## 2023-04-19 DIAGNOSIS — H81.10 BENIGN PAROXYSMAL POSITIONAL VERTIGO, UNSPECIFIED LATERALITY: Primary | ICD-10-CM

## 2023-04-19 PROCEDURE — G8427 DOCREV CUR MEDS BY ELIG CLIN: HCPCS | Performed by: NURSE PRACTITIONER

## 2023-04-19 PROCEDURE — 1090F PRES/ABSN URINE INCON ASSESS: CPT | Performed by: NURSE PRACTITIONER

## 2023-04-19 PROCEDURE — 99213 OFFICE O/P EST LOW 20 MIN: CPT | Performed by: NURSE PRACTITIONER

## 2023-04-19 PROCEDURE — G8400 PT W/DXA NO RESULTS DOC: HCPCS | Performed by: NURSE PRACTITIONER

## 2023-04-19 PROCEDURE — 1123F ACP DISCUSS/DSCN MKR DOCD: CPT | Performed by: NURSE PRACTITIONER

## 2023-04-19 PROCEDURE — G8417 CALC BMI ABV UP PARAM F/U: HCPCS | Performed by: NURSE PRACTITIONER

## 2023-04-19 PROCEDURE — 1036F TOBACCO NON-USER: CPT | Performed by: NURSE PRACTITIONER

## 2023-04-19 RX ORDER — MECLIZINE HCL 12.5 MG/1
12.5 TABLET ORAL 3 TIMES DAILY PRN
Qty: 30 TABLET | Refills: 0 | Status: SHIPPED | OUTPATIENT
Start: 2023-04-19 | End: 2023-04-29

## 2023-04-19 SDOH — ECONOMIC STABILITY: FOOD INSECURITY: WITHIN THE PAST 12 MONTHS, THE FOOD YOU BOUGHT JUST DIDN'T LAST AND YOU DIDN'T HAVE MONEY TO GET MORE.: NEVER TRUE

## 2023-04-19 SDOH — ECONOMIC STABILITY: INCOME INSECURITY: HOW HARD IS IT FOR YOU TO PAY FOR THE VERY BASICS LIKE FOOD, HOUSING, MEDICAL CARE, AND HEATING?: NOT HARD AT ALL

## 2023-04-19 SDOH — ECONOMIC STABILITY: HOUSING INSECURITY
IN THE LAST 12 MONTHS, WAS THERE A TIME WHEN YOU DID NOT HAVE A STEADY PLACE TO SLEEP OR SLEPT IN A SHELTER (INCLUDING NOW)?: NO

## 2023-04-19 SDOH — ECONOMIC STABILITY: FOOD INSECURITY: WITHIN THE PAST 12 MONTHS, YOU WORRIED THAT YOUR FOOD WOULD RUN OUT BEFORE YOU GOT MONEY TO BUY MORE.: NEVER TRUE

## 2023-04-19 ASSESSMENT — PATIENT HEALTH QUESTIONNAIRE - PHQ9
SUM OF ALL RESPONSES TO PHQ QUESTIONS 1-9: 0
2. FEELING DOWN, DEPRESSED OR HOPELESS: 0
1. LITTLE INTEREST OR PLEASURE IN DOING THINGS: 0
SUM OF ALL RESPONSES TO PHQ9 QUESTIONS 1 & 2: 0
SUM OF ALL RESPONSES TO PHQ QUESTIONS 1-9: 0

## 2023-09-12 ENCOUNTER — OFFICE VISIT (OUTPATIENT)
Dept: FAMILY MEDICINE CLINIC | Age: 78
End: 2023-09-12
Payer: MEDICARE

## 2023-09-12 ENCOUNTER — TELEPHONE (OUTPATIENT)
Dept: FAMILY MEDICINE CLINIC | Age: 78
End: 2023-09-12

## 2023-09-12 VITALS
DIASTOLIC BLOOD PRESSURE: 78 MMHG | BODY MASS INDEX: 35.37 KG/M2 | OXYGEN SATURATION: 96 % | HEART RATE: 84 BPM | TEMPERATURE: 99.5 F | WEIGHT: 207.2 LBS | SYSTOLIC BLOOD PRESSURE: 139 MMHG | HEIGHT: 64 IN

## 2023-09-12 DIAGNOSIS — I44.7 LBBB (LEFT BUNDLE BRANCH BLOCK): ICD-10-CM

## 2023-09-12 DIAGNOSIS — R53.83 OTHER FATIGUE: ICD-10-CM

## 2023-09-12 DIAGNOSIS — M79.604 BILATERAL LEG PAIN: ICD-10-CM

## 2023-09-12 DIAGNOSIS — R06.09 DYSPNEA ON EXERTION: Primary | ICD-10-CM

## 2023-09-12 DIAGNOSIS — R42 DIZZINESS: ICD-10-CM

## 2023-09-12 DIAGNOSIS — M79.605 BILATERAL LEG PAIN: ICD-10-CM

## 2023-09-12 DIAGNOSIS — R06.09 OTHER FORM OF DYSPNEA: ICD-10-CM

## 2023-09-12 DIAGNOSIS — I05.9 MITRAL VALVE DISEASE: ICD-10-CM

## 2023-09-12 LAB
ALBUMIN SERPL-MCNC: 4.3 G/DL (ref 3.4–5)
ALBUMIN/GLOB SERPL: 1.3 {RATIO} (ref 1.1–2.2)
ALP SERPL-CCNC: 71 U/L (ref 40–129)
ALT SERPL-CCNC: 13 U/L (ref 10–40)
ANION GAP SERPL CALCULATED.3IONS-SCNC: 9 MMOL/L (ref 3–16)
AST SERPL-CCNC: 15 U/L (ref 15–37)
BASOPHILS # BLD: 0.1 K/UL (ref 0–0.2)
BASOPHILS NFR BLD: 0.8 %
BILIRUB SERPL-MCNC: 0.3 MG/DL (ref 0–1)
BUN SERPL-MCNC: 13 MG/DL (ref 7–20)
CALCIUM SERPL-MCNC: 9.9 MG/DL (ref 8.3–10.6)
CHLORIDE SERPL-SCNC: 103 MMOL/L (ref 99–110)
CO2 SERPL-SCNC: 28 MMOL/L (ref 21–32)
CREAT SERPL-MCNC: 0.9 MG/DL (ref 0.6–1.2)
D DIMER: 0.4 UG/ML FEU (ref 0–0.6)
DEPRECATED RDW RBC AUTO: 13.5 % (ref 12.4–15.4)
EOSINOPHIL # BLD: 0.2 K/UL (ref 0–0.6)
EOSINOPHIL NFR BLD: 2.7 %
GFR SERPLBLD CREATININE-BSD FMLA CKD-EPI: >60 ML/MIN/{1.73_M2}
GLUCOSE SERPL-MCNC: 106 MG/DL (ref 70–99)
HCT VFR BLD AUTO: 40.8 % (ref 36–48)
HGB BLD-MCNC: 14.1 G/DL (ref 12–16)
LYMPHOCYTES # BLD: 1.9 K/UL (ref 1–5.1)
LYMPHOCYTES NFR BLD: 25.7 %
MCH RBC QN AUTO: 31.7 PG (ref 26–34)
MCHC RBC AUTO-ENTMCNC: 34.5 G/DL (ref 31–36)
MCV RBC AUTO: 92 FL (ref 80–100)
MONOCYTES # BLD: 0.6 K/UL (ref 0–1.3)
MONOCYTES NFR BLD: 8.1 %
NEUTROPHILS # BLD: 4.6 K/UL (ref 1.7–7.7)
NEUTROPHILS NFR BLD: 62.7 %
NT-PROBNP SERPL-MCNC: 134 PG/ML (ref 0–449)
PLATELET # BLD AUTO: 296 K/UL (ref 135–450)
PMV BLD AUTO: 7.2 FL (ref 5–10.5)
POTASSIUM SERPL-SCNC: 4 MMOL/L (ref 3.5–5.1)
PROT SERPL-MCNC: 7.5 G/DL (ref 6.4–8.2)
RBC # BLD AUTO: 4.43 M/UL (ref 4–5.2)
SODIUM SERPL-SCNC: 140 MMOL/L (ref 136–145)
WBC # BLD AUTO: 7.3 K/UL (ref 4–11)

## 2023-09-12 PROCEDURE — G8400 PT W/DXA NO RESULTS DOC: HCPCS | Performed by: FAMILY MEDICINE

## 2023-09-12 PROCEDURE — 1090F PRES/ABSN URINE INCON ASSESS: CPT | Performed by: FAMILY MEDICINE

## 2023-09-12 PROCEDURE — 36415 COLL VENOUS BLD VENIPUNCTURE: CPT | Performed by: FAMILY MEDICINE

## 2023-09-12 PROCEDURE — 1036F TOBACCO NON-USER: CPT | Performed by: FAMILY MEDICINE

## 2023-09-12 PROCEDURE — G8417 CALC BMI ABV UP PARAM F/U: HCPCS | Performed by: FAMILY MEDICINE

## 2023-09-12 PROCEDURE — G8427 DOCREV CUR MEDS BY ELIG CLIN: HCPCS | Performed by: FAMILY MEDICINE

## 2023-09-12 PROCEDURE — 1123F ACP DISCUSS/DSCN MKR DOCD: CPT | Performed by: FAMILY MEDICINE

## 2023-09-12 PROCEDURE — 93000 ELECTROCARDIOGRAM COMPLETE: CPT | Performed by: FAMILY MEDICINE

## 2023-09-12 PROCEDURE — 99214 OFFICE O/P EST MOD 30 MIN: CPT | Performed by: FAMILY MEDICINE

## 2023-09-12 NOTE — TELEPHONE ENCOUNTER
CD STRESS TEST W ISOTOPE    Anatomical Region Laterality Modality   Chest -- Other     Narrative    [facility]     [facility]   [facility]   Nuclear Stress Laboratory   [address] [facility]   Phone: [phone]   Fax: [phone]                   Nuclear Myocardial Perfusion Imaging Stress Report   Name: Ly Koo   : 1945                   Age: 77 yrs   EPI: [id number]              Gender: Female   Height: 66 in                     Weight: 195 lb   Accession Number: [id number]   Order Number: [id number]   Account Number: [de-identified]   Patient Location: [facility]   Study Date: 2021 12:11 PM     Interpetation Summary:   The LV EF is 66%   There is a small size, partially reversible defect in the anteroapical wall   consistent with mild edgar-infarct ischemia. Elevated SBP at rest.   The stress TPD is 7% and the rest 5% suggesting most of the defect is either   fixed which could relate to prior infarction of hibernating myocardium. The   degree of increased uptake at rest within the defect is about 5%. There is some mild focal distal anteroseptal hypokinesia observed;this may   relate to the underlying LBBB. o Non-diagnostic ECG for ischemia with pharmocologic stress. o Normal left ventriular systolic function. o Positive nuclear stress imaging suggestive of scar plus edgar infarct        ischemia in the anteroapical wall.      o There are numerous potential explanations for the nuclear scinitgraphic        findings and clinical correlation is strongly recommended. Procedure: 67154 Pharmacologic stress with Isotope. Study Protocol: One day   rest/stress acquisition. Reason for Study: Abnormal EKG [R94.31 (ICD-10-CM)]; LBBB (left bundle branch   block) [I44.7 (ICD-10-CM)]; SOB (shortness of breath) [R06.02 (ICD-10-CM)]. Stress Data   Stress Protocol: Jaron Stinson. Stress Dose: 0.4mg/5ml IV.

## 2023-09-12 NOTE — TELEPHONE ENCOUNTER
CL CARDIAC CATHETERIZATION    Anatomical Region Laterality Modality   -- -- X-Ray Angiography     Impression    :   1) Normal coronary arteries   2) Normal LV systolic function and EDP   3) Moderate to severe MAC   4) LBBB, unknown duration   5) Exercise intolerance, abnormal perfusion scan, consider possible   underlying microvascular coronary dysfunction (KAYA)   6) Normal right radial artery by ultrasound     PLAN:   1) Recommend trial for Ranexa 500 mg po BID for BOWSER, exercise intolerance   (KAYA)   2) FU with Dr. Claudean Notice and Dr. Kinga Yousif as scheduled     Leydi Terrell MD, [facility], [facility]  Narrative    This result has an attachment that is not available. Cardiac Catheterization Report:   [facility])     Patient Name: Ju Hess   MRN: [id number] : 1945   Procedure Date: 2021     Procedures:   1) Left Heart Catheterization   2) Coronary Angiogram   3) Left Ventriculogram   4) Ultrasound guided right radial artery access     MD: Leydi Terrell MD     Indications: BOWSER, exercise intolerance, HLD, LBBB, abnormal nuclear stress   test (intermediate risk). Risks and benefits explained and informed   consent signed. Access: Right Radial Artery 5Fr - Sonosite ultrasound guide access   Catheters: JR/JL 3.5. Estimated blood loss: < 10 ml. Complications: none. Specimens: none. Contrast: 85 cc Optiray. Moderate sedation given - IV medications:  Versed, Fentanyl, Compazine   Other medications: Radial artery cocktail (Verapamil 2.5 mg IA,   Nitrogylcerin 200 mcg IA), IV heparin 4,500 units, Lopressor 5 mg IV. Moderate sedation time supervised by MD above: 30 minutes. Findings:     Ultrasound findings:     Right radial artery: dual artery to the wrist, both small to normal   caliber without atherosclerosis     Left Ventricle:  EF=60%,  Wall motion: normal.  LVEDP=5 mmHg. No aortic   valve gradient seen.  Moderate to

## 2023-09-12 NOTE — TELEPHONE ENCOUNTER
Please place urgent request for stress test, heart catheterization, EKG results from [facility], 2021. Patient does not remember having this testing done, but results are showing in care everywhere.

## 2023-09-12 NOTE — TELEPHONE ENCOUNTER
ECG 12 lead     Ref Range & Units 2 yr ago   Height in    HEART RATE bpm 85    RR INTERVAL ms 706    ATRIAL RATE ms 85    P-R Interval ms 187    P Duration ms 125    P HORIZONTAL deg 16    P FRONT AXIS deg 47    Q Onset ms 508    QRSD Interval ms 132    QT INTERVAL ms 411    QTCB ms 489    QTcF ms 462    QRS Horizontal Axis deg 267    QRS AXIS deg -34    I-40 Horizontal Axis deg -79    I-40 FRONT AXIS deg -3    T-40 Horizontal Axis deg 253    T-40 Front Axis deg -49    T Horizontal Axis deg 62    T WAVE AXIS deg 99    S-T Horizontal Axis deg 87    S-T Front Axis deg 132    ECG IMPRESSION  - ABNORMAL ECG -    ECG IMPRESSION  SR-Sinus rhythm-normal P axis, V-rate 50-99    ECG IMPRESSION  LBBB-Left bundle branch block-QRSd>120, broad/notched R    ECGGUID  [id number]    Resulting Agency  [facility] [place] [facility]   Specimen Collected: 08/16/21 08:02 Last Resulted: 08/16/21 14:03   Received From: [facility]  Result Received: 04/16/22 11:43

## 2023-09-12 NOTE — TELEPHONE ENCOUNTER
Patient and daughter do not remember these tests ever being done. Want to make sure they are linked to right patient.

## 2023-09-13 NOTE — TELEPHONE ENCOUNTER
Spoke with Sindi Garnica, does remember the stress test at the hospital but not the cath being done. Does not recall the cath. Called [facility] spoke with Teresa she will fax over office note from where procedure was done but note states patients name during procedure.

## 2023-09-14 NOTE — TELEPHONE ENCOUNTER
Please let LYSOGENE Player know I would like her to have a heart ultrasound. Looking at previous cath report, she did have a significant amount of calcium on her mitral valve. With this finding and her symptoms, would like to go ahead and get echo before cardiology appointment. She can call [phone] to have echo scheduled. If she has any questions, please let me know and I will give her a call.

## 2023-09-15 ENCOUNTER — TELEPHONE (OUTPATIENT)
Dept: FAMILY MEDICINE CLINIC | Age: 78
End: 2023-09-15

## 2023-09-15 NOTE — TELEPHONE ENCOUNTER
Please check with [facility], she has had testing with them before. I would really like her to have it in the next week to most 10 days.

## 2023-09-15 NOTE — TELEPHONE ENCOUNTER
----- Message from Elma Libman sent at 9/15/2023  9:17 AM EDT -----  Subject: Message to Provider    QUESTIONS  Information for Provider? Patient called in stating that it would be in   December before she could get her Echocardiogram done through [facility]. Patient would like to know where else she could go to get it done sooner   and would like a paper copy of the order so she can take it somewhere   else. Patient would like a call back when she can pick this up. Thanks.  ---------------------------------------------------------------------------  --------------  Doris MADRIGAL  [phone]; OK to leave message on voicemail  ---------------------------------------------------------------------------  --------------  SCRIPT ANSWERS  Relationship to Patient?  Self

## 2023-09-15 NOTE — TELEPHONE ENCOUNTER
Patient called in stating that it would be in December before she could get her Echocardiogram done through [facility]. Patient would like to know where else she could go to get it done sooner and would like a paper copy of the order so she can take it somewhere else. Patient would like a call back when she can pick this up. Thanks.

## 2023-09-15 NOTE — TELEPHONE ENCOUNTER
Km Ellis from [facility] central scheduling called and stated they need an electronic copy of the EKG order to be faxed to them at [phone]

## 2023-09-15 NOTE — TELEPHONE ENCOUNTER
SHILOH, called [facility]/good milo and can normally get in within a week or two per call center. Kaylie Espinosa will  paper and go to schedule with them.

## 2023-10-12 ENCOUNTER — OFFICE VISIT (OUTPATIENT)
Dept: CARDIOLOGY CLINIC | Age: 78
End: 2023-10-12

## 2023-10-12 VITALS
SYSTOLIC BLOOD PRESSURE: 132 MMHG | OXYGEN SATURATION: 96 % | HEART RATE: 83 BPM | DIASTOLIC BLOOD PRESSURE: 84 MMHG | HEIGHT: 64 IN | BODY MASS INDEX: 35.17 KG/M2 | WEIGHT: 206 LBS

## 2023-10-12 DIAGNOSIS — R06.09 DOE (DYSPNEA ON EXERTION): Primary | ICD-10-CM

## 2023-10-12 DIAGNOSIS — I44.7 LBBB (LEFT BUNDLE BRANCH BLOCK): ICD-10-CM

## 2023-10-12 RX ORDER — MELOXICAM 15 MG/1
15 TABLET ORAL DAILY
COMMUNITY
Start: 2023-10-03

## 2023-10-12 NOTE — PROGRESS NOTES
[place]  1945    October 12, 2023    Reason for Consult: Dyspnea on exertion     CC: \"My shortness of breath is worse\"     HPI:  The patient is 69 y.o. female with no known significant cardiac history who presents for evaluation of dyspnea on exertion. She [place] Ria Crowell [place] [facility] [place]ness of breath and LBBB noted on an EKG. She completed an abnormal stress test and underwent a left heart catheterization 8/16/21 revealing normal coronary arteries. She completed an echocardiogram revealing a normal EF of 55-60%. She reports worsening shortness of breath for the past few years. She states her breathing is worse than it was in 2021. She reports shortness of breath with minimal exertion. She states she must lay on her side to sleep at night. She denies any coughing or \"choking\" throughout the night. She states she has never followed up with a pulmonologist. She states she has never been a smoker. She denies any chest pain or pressure. She reports medication compliance and is tolerating. She denies any abnormal bleeding or bruising. She denies exertional chest pain/pressure, dyspnea at rest,  PND, orthopnea, palpitations, lightheadedness, weight changes, changes in LE edema, and syncope. Review of Systems:  Constitutional: No fatigue, weakness, night sweats or fever. HEENT: No new vision difficulties or ringing in the ears. Respiratory: No new SOB, PND, orthopnea or cough. Cardiovascular: See HPI   GI: No n/v, diarrhea, constipation, abdominal pain or changes in bowel habits. No melena, no hematochezia  : No urinary frequency, urgency, incontinence, hematuria or dysuria. Skin: No cyanosis or skin lesions. Musculoskeletal: No new muscle or joint pain. Neurological: No syncope or TIA-like symptoms.   Psychiatric: No anxiety, insomnia or depression     Past Medical History:   Diagnosis Date    Cyst of left kidney     H/O tooth extraction     Pseudogout

## 2023-10-19 ENCOUNTER — HOSPITAL ENCOUNTER (OUTPATIENT)
Age: 78
Discharge: HOME | End: 2023-10-19
Payer: MEDICARE

## 2023-10-19 ENCOUNTER — HOSPITAL ENCOUNTER (OUTPATIENT)
Dept: GENERAL RADIOLOGY | Age: 78
Discharge: HOME | End: 2023-10-19
Attending: INTERNAL MEDICINE
Payer: MEDICARE

## 2023-10-19 ENCOUNTER — OFFICE VISIT (OUTPATIENT)
Dept: PULMONOLOGY | Age: 78
End: 2023-10-19
Payer: MEDICARE

## 2023-10-19 VITALS
HEART RATE: 80 BPM | DIASTOLIC BLOOD PRESSURE: 72 MMHG | OXYGEN SATURATION: 95 % | TEMPERATURE: 97.8 F | BODY MASS INDEX: 35.13 KG/M2 | HEIGHT: 64 IN | RESPIRATION RATE: 18 BRPM | WEIGHT: 205.8 LBS | SYSTOLIC BLOOD PRESSURE: 120 MMHG

## 2023-10-19 DIAGNOSIS — R06.02 SOB (SHORTNESS OF BREATH): Primary | ICD-10-CM

## 2023-10-19 DIAGNOSIS — R06.02 SOB (SHORTNESS OF BREATH): ICD-10-CM

## 2023-10-19 PROCEDURE — G8417 CALC BMI ABV UP PARAM F/U: HCPCS | Performed by: INTERNAL MEDICINE

## 2023-10-19 PROCEDURE — 71046 X-RAY EXAM CHEST 2 VIEWS: CPT

## 2023-10-19 PROCEDURE — 99204 OFFICE O/P NEW MOD 45 MIN: CPT | Performed by: INTERNAL MEDICINE

## 2023-10-19 PROCEDURE — G8427 DOCREV CUR MEDS BY ELIG CLIN: HCPCS | Performed by: INTERNAL MEDICINE

## 2023-10-19 PROCEDURE — 1090F PRES/ABSN URINE INCON ASSESS: CPT | Performed by: INTERNAL MEDICINE

## 2023-10-19 PROCEDURE — G8484 FLU IMMUNIZE NO ADMIN: HCPCS | Performed by: INTERNAL MEDICINE

## 2023-10-19 PROCEDURE — G8400 PT W/DXA NO RESULTS DOC: HCPCS | Performed by: INTERNAL MEDICINE

## 2023-10-19 PROCEDURE — 1036F TOBACCO NON-USER: CPT | Performed by: INTERNAL MEDICINE

## 2023-10-19 PROCEDURE — 1123F ACP DISCUSS/DSCN MKR DOCD: CPT | Performed by: INTERNAL MEDICINE

## 2023-10-30 ENCOUNTER — HOSPITAL ENCOUNTER (OUTPATIENT)
Dept: CARDIAC REHAB | Age: 78
Setting detail: THERAPIES SERIES
Discharge: HOME | End: 2023-10-30
Payer: MEDICARE

## 2023-10-30 ENCOUNTER — HOSPITAL ENCOUNTER (OUTPATIENT)
Dept: PULMONOLOGY | Age: 78
Discharge: HOME | End: 2023-10-30
Payer: MEDICARE

## 2023-10-30 LAB
DLCO %PRED: 134 %
DLCO PRED: NORMAL
DLCO/VA %PRED: NORMAL
DLCO/VA PRED: NORMAL
DLCO/VA: NORMAL
DLCO: NORMAL
EXPIRATORY TIME-POST: NORMAL
EXPIRATORY TIME: NORMAL
FEF 25-75% %CHNG: NORMAL
FEF 25-75% %PRED-POST: NORMAL
FEF 25-75% %PRED-PRE: NORMAL
FEF 25-75% PRED: NORMAL
FEF 25-75%-POST: NORMAL
FEF 25-75%-PRE: NORMAL
FEV1 %PRED-POST: 114 %
FEV1 %PRED-PRE: 100 %
FEV1 PRED: NORMAL
FEV1-POST: NORMAL
FEV1-PRE: NORMAL
FEV1/FVC %PRED-POST: NORMAL
FEV1/FVC %PRED-PRE: NORMAL
FEV1/FVC PRED: NORMAL
FEV1/FVC-POST: 81 %
FEV1/FVC-PRE: 79 %
FVC %PRED-POST: NORMAL
FVC %PRED-PRE: NORMAL
FVC PRED: NORMAL
FVC-POST: NORMAL
FVC-PRE: NORMAL
GAW %PRED: NORMAL
GAW PRED: NORMAL
GAW: NORMAL
IC %PRED: NORMAL
IC PRED: NORMAL
IC: NORMAL
MEP: NORMAL
MIP: NORMAL
MVV %PRED-PRE: NORMAL
MVV PRED: NORMAL
MVV-PRE: NORMAL
PEF %PRED-POST: NORMAL
PEF %PRED-PRE: NORMAL
PEF PRED: NORMAL
PEF%CHNG: NORMAL
PEF-POST: NORMAL
PEF-PRE: NORMAL
RAW %PRED: NORMAL
RAW PRED: NORMAL
RAW: NORMAL
RV %PRED: NORMAL
RV PRED: NORMAL
RV: NORMAL
SVC %PRED: NORMAL
SVC PRED: NORMAL
SVC: NORMAL
TLC %PRED: 106 %
TLC PRED: NORMAL
TLC: NORMAL
VA %PRED: NORMAL
VA PRED: NORMAL
VA: NORMAL
VTG %PRED: NORMAL
VTG PRED: NORMAL
VTG: NORMAL

## 2023-10-30 PROCEDURE — 94664 DEMO&/EVAL PT USE INHALER: CPT

## 2023-10-30 PROCEDURE — 94060 EVALUATION OF WHEEZING: CPT

## 2023-10-30 PROCEDURE — 94640 AIRWAY INHALATION TREATMENT: CPT

## 2023-10-30 PROCEDURE — 94618 PULMONARY STRESS TESTING: CPT

## 2023-10-30 PROCEDURE — 94726 PLETHYSMOGRAPHY LUNG VOLUMES: CPT

## 2023-10-30 PROCEDURE — 94729 DIFFUSING CAPACITY: CPT

## 2023-10-30 PROCEDURE — 6370000000 HC RX 637 (ALT 250 FOR IP): Performed by: INTERNAL MEDICINE

## 2023-10-30 RX ORDER — ALBUTEROL SULFATE 90 UG/1
4 AEROSOL, METERED RESPIRATORY (INHALATION) ONCE
Status: COMPLETED | OUTPATIENT
Start: 2023-10-30 | End: 2023-10-30

## 2023-10-30 RX ADMIN — ALBUTEROL SULFATE 4 PUFF: 90 AEROSOL, METERED RESPIRATORY (INHALATION) at 13:25

## 2023-10-30 ASSESSMENT — PULMONARY FUNCTION TESTS
FEV1/FVC_POST: 81
FEV1_PERCENT_PREDICTED_POST: 114
FEV1_PERCENT_PREDICTED_PRE: 100
FEV1/FVC_PRE: 79

## 2023-10-30 NOTE — PROCEDURES
[facility] Cardiopulmonary Rehabilitation   Six Minute Walk Test    Eunice Marquezmaury CLEVELAND[place]: 1945  Account Number: [de-identified]  AGE: 69 y.o.     OP test [x]   Pulmonary Rehab PRE []  POST   []    Diagnosis: Shortness of breath     Referring Physician: Dr. Minoo Esteves []  male [x] female AGE:78     Height:5 ft 6 in 168 cm    Weight:204 lbs  93 kg  Blood Pressure 140/72    Medications affecting heart rate:  none      Supplemental O2 during the test   no [x] yes   lpm     [] continuous []  intermittent    Device : [] NC []  HFNC    []  oximizer  [] mask      Laps completed: 10 (1 lap = 100 ft)        Baseline (resting) Walking End of Test (recovery)      Heart Rate 87   103  90    BP           140/72   150/90  144/72    Dyspnea 2   5  0.5 (Cj scale  Fatigue 2   4  3 (Cj scale)    SpO2  96%   94%  95%         Stopped or paused before 6 mins? [x]  no [] yes How long? Symptoms at end of exercise:[] angina  [] dizziness  [x]  hip, and  back pain- related to arthritis       []  no complaints []  other    Number of laps 10 (x 30.48 meters) + 11 meters 316 meters     Total distance walked in 6 mins: 316 meters    Predicted distance: 364 meters  Percent predicted:87%              [x] normal       [] reduced     Summary: This is an outpatient 6 minute walk test, performed on room air. The patient ambulated 316 meters which is normal-87% predicted. Her, heart rate response was normal, the blood pressure response was normal, oxygen saturations were normal.  The patient desaturated to 94% while ambulating on room air. The degree of symptoms based on the Cj Dyspnea/Fatigue scale were increased with testing. This test does not indicate the need for supplemental oxygen.           Bert Weinstein DO  Pulmonary Rehab Director

## 2023-10-31 PROCEDURE — 94618 PULMONARY STRESS TESTING: CPT | Performed by: INTERNAL MEDICINE

## 2023-10-31 PROCEDURE — 94727 GAS DIL/WSHOT DETER LNG VOL: CPT | Performed by: INTERNAL MEDICINE

## 2023-10-31 PROCEDURE — 94060 EVALUATION OF WHEEZING: CPT | Performed by: INTERNAL MEDICINE

## 2023-10-31 PROCEDURE — 94729 DIFFUSING CAPACITY: CPT | Performed by: INTERNAL MEDICINE

## 2023-10-31 NOTE — PROCEDURES
Spirometry was acceptable and reproducible by ATS standards    Spirometry  Spirometry is normal.    Bronchodilator  There is no response to bronchodilator demonstrated. [Increase in FEV1 and/or FVC => 12% of control and => 200 ml]    Lung Volumes  Lung volumes are normal.    Diffusing Capacity  Diffusing capacity is normal.      Overall Interpretation  PFT does not demonstrates obstruction with FEV1 of 100 %  FVC of 97%  without bronchodilator response. Normal lung volume without air trapping or hyperinflation Diffusion capacity is normal  This is consistent with normal PFT.       Pulmonary Function Testing Results:    FEV1 %Pred-Pre   Date Value Ref Range Status   10/30/2023 100 % Final     FEV1 %Pred-Post   Date Value Ref Range Status   10/30/2023 114 % Final     FEV1/FVC-Pre   Date Value Ref Range Status   10/30/2023 79 % Final     FEV1/FVC-Post   Date Value Ref Range Status   10/30/2023 81 % Final     TLC %Pred   Date Value Ref Range Status   10/30/2023 106 % Final     DLCO %Pred   Date Value Ref Range Status   10/30/2023 134 % Final             Obstruction severity and Restriction Severity using FEV1 %  Gold Stage Severity per ERS/ATS FEV1 % per ERS/ATS   GOLD I:  FEV1>80% Mild COPD >70   GOLD II:  FEV1 50-79% Moderate 50-70   GOLD III:  FEV1 30-49% Severe 35-50   GOLD IV:  FEV1 <30 Very Severe  <35       DCLO:  Normal:  %  Mild:  65-70%  Moderate:  41-60%  Severe:  <40%      PFT data will be scanned into the procedure tab in epic under this encounter    MD OSMAN Lopez [facility] Pulmonology

## 2024-01-10 ENCOUNTER — OFFICE VISIT (OUTPATIENT)
Dept: PULMONOLOGY | Age: 79
End: 2024-01-10
Payer: MEDICARE

## 2024-01-10 VITALS
DIASTOLIC BLOOD PRESSURE: 60 MMHG | HEART RATE: 88 BPM | BODY MASS INDEX: 32.92 KG/M2 | RESPIRATION RATE: 18 BRPM | OXYGEN SATURATION: 98 % | HEIGHT: 66 IN | WEIGHT: 204.8 LBS | TEMPERATURE: 97.3 F | SYSTOLIC BLOOD PRESSURE: 122 MMHG

## 2024-01-10 DIAGNOSIS — R06.02 SOB (SHORTNESS OF BREATH): ICD-10-CM

## 2024-01-10 DIAGNOSIS — F41.1 GAD (GENERALIZED ANXIETY DISORDER): Primary | ICD-10-CM

## 2024-01-10 PROCEDURE — 1090F PRES/ABSN URINE INCON ASSESS: CPT | Performed by: INTERNAL MEDICINE

## 2024-01-10 PROCEDURE — G8400 PT W/DXA NO RESULTS DOC: HCPCS | Performed by: INTERNAL MEDICINE

## 2024-01-10 PROCEDURE — 1123F ACP DISCUSS/DSCN MKR DOCD: CPT | Performed by: INTERNAL MEDICINE

## 2024-01-10 PROCEDURE — G8484 FLU IMMUNIZE NO ADMIN: HCPCS | Performed by: INTERNAL MEDICINE

## 2024-01-10 PROCEDURE — G8427 DOCREV CUR MEDS BY ELIG CLIN: HCPCS | Performed by: INTERNAL MEDICINE

## 2024-01-10 PROCEDURE — G8417 CALC BMI ABV UP PARAM F/U: HCPCS | Performed by: INTERNAL MEDICINE

## 2024-01-10 PROCEDURE — 99214 OFFICE O/P EST MOD 30 MIN: CPT | Performed by: INTERNAL MEDICINE

## 2024-01-10 PROCEDURE — 1036F TOBACCO NON-USER: CPT | Performed by: INTERNAL MEDICINE

## 2024-01-10 RX ORDER — ALPRAZOLAM 0.5 MG/1
0.5 TABLET ORAL 3 TIMES DAILY PRN
Qty: 30 TABLET | Refills: 0 | Status: SHIPPED | OUTPATIENT
Start: 2024-01-10 | End: 2024-02-09

## 2024-01-10 RX ORDER — ALBUTEROL SULFATE AND BUDESONIDE 90; 80 UG/1; UG/1
2 AEROSOL, METERED RESPIRATORY (INHALATION) 4 TIMES DAILY PRN
Qty: 1 G | Refills: 0 | Status: SHIPPED | COMMUNITY
Start: 2024-01-10

## 2024-01-10 NOTE — PROGRESS NOTES
deficit, awake and alert   Psych: ntact judgement and insight.    Current Outpatient Medications   Medication Sig Dispense Refill    ALPRAZolam (XANAX) 0.5 MG tablet Take 1 tablet by mouth 3 times daily as needed for Anxiety for up to 30 days. Max Daily Amount: 1.5 mg 30 tablet 0    Albuterol-Budesonide (AIRSUPRA) 90-80 MCG/ACT AERO Inhale 2 puffs into the lungs 4 times daily as needed (sob) 1 g 0    ibuprofen (ADVIL;MOTRIN) 200 MG tablet Take 1 tablet by mouth every 6 hours as needed for Pain       No current facility-administered medications for this visit.       Data Reviewed:   CBC and Renal reviewed  Last CBC  Lab Results   Component Value Date/Time    WBC 7.3 09/12/2023 12:31 PM    RBC 4.43 09/12/2023 12:31 PM    HGB 14.1 09/12/2023 12:31 PM    MCV 92.0 09/12/2023 12:31 PM     09/12/2023 12:31 PM     Last Renal  Lab Results   Component Value Date/Time     09/12/2023 12:31 PM    K 4.0 09/12/2023 12:31 PM    K 4.1 04/16/2022 02:48 PM     09/12/2023 12:31 PM    CO2 28 09/12/2023 12:31 PM    CO2 23 04/16/2022 02:48 PM    BUN 13 09/12/2023 12:31 PM    CREATININE 0.9 09/12/2023 12:31 PM    GLUCOSE 106 09/12/2023 12:31 PM    CALCIUM 9.9 09/12/2023 12:31 PM           Radiology Review:  Pertinent images / reports were reviewed as a part of this visit.        Pulmonary function testing  None on file         This note was transcribed using Dragon Dictation software. Please disregard any translational errors.    Zi Zimmerman MD  [facility] Pulmonary, Sleep and Critical Care

## 2024-01-16 ENCOUNTER — PATIENT MESSAGE (OUTPATIENT)
Dept: PULMONOLOGY | Age: 79
End: 2024-01-16

## 2024-01-17 RX ORDER — ALBUTEROL SULFATE AND BUDESONIDE 90; 80 UG/1; UG/1
2 AEROSOL, METERED RESPIRATORY (INHALATION) EVERY 6 HOURS PRN
Qty: 1 G | Refills: 5 | Status: SHIPPED | OUTPATIENT
Start: 2024-01-17

## 2024-01-17 NOTE — TELEPHONE ENCOUNTER
From: Rosalva Jean  To: Dr. Zi Zimmerman  Sent: 1/16/2024 6:04 PM EST  Subject: Inhaler    Could you please renew my prescription for the inhaler that you gave me a week ago. Thank you. Rosalva jean

## 2024-01-20 ENCOUNTER — HOSPITAL ENCOUNTER (OUTPATIENT)
Dept: CT IMAGING | Age: 79
Discharge: HOME | End: 2024-01-20
Attending: INTERNAL MEDICINE
Payer: MEDICARE

## 2024-01-20 DIAGNOSIS — R06.02 SOB (SHORTNESS OF BREATH): ICD-10-CM

## 2024-01-20 PROCEDURE — 71250 CT THORAX DX C-: CPT

## 2024-02-01 ENCOUNTER — OFFICE VISIT (OUTPATIENT)
Dept: PULMONOLOGY | Age: 79
End: 2024-02-01
Payer: MEDICARE

## 2024-02-01 VITALS
HEIGHT: 66 IN | SYSTOLIC BLOOD PRESSURE: 120 MMHG | OXYGEN SATURATION: 96 % | WEIGHT: 207.6 LBS | TEMPERATURE: 98.7 F | BODY MASS INDEX: 33.37 KG/M2 | DIASTOLIC BLOOD PRESSURE: 70 MMHG | RESPIRATION RATE: 16 BRPM | HEART RATE: 100 BPM

## 2024-02-01 DIAGNOSIS — R06.02 SOB (SHORTNESS OF BREATH): ICD-10-CM

## 2024-02-01 DIAGNOSIS — E04.1 RIGHT THYROID NODULE: Primary | ICD-10-CM

## 2024-02-01 DIAGNOSIS — K21.9 GASTROESOPHAGEAL REFLUX DISEASE WITHOUT ESOPHAGITIS: ICD-10-CM

## 2024-02-01 PROCEDURE — 1036F TOBACCO NON-USER: CPT | Performed by: INTERNAL MEDICINE

## 2024-02-01 PROCEDURE — G8400 PT W/DXA NO RESULTS DOC: HCPCS | Performed by: INTERNAL MEDICINE

## 2024-02-01 PROCEDURE — G8484 FLU IMMUNIZE NO ADMIN: HCPCS | Performed by: INTERNAL MEDICINE

## 2024-02-01 PROCEDURE — G8417 CALC BMI ABV UP PARAM F/U: HCPCS | Performed by: INTERNAL MEDICINE

## 2024-02-01 PROCEDURE — 1090F PRES/ABSN URINE INCON ASSESS: CPT | Performed by: INTERNAL MEDICINE

## 2024-02-01 PROCEDURE — 1123F ACP DISCUSS/DSCN MKR DOCD: CPT | Performed by: INTERNAL MEDICINE

## 2024-02-01 PROCEDURE — 99214 OFFICE O/P EST MOD 30 MIN: CPT | Performed by: INTERNAL MEDICINE

## 2024-02-01 PROCEDURE — G8427 DOCREV CUR MEDS BY ELIG CLIN: HCPCS | Performed by: INTERNAL MEDICINE

## 2024-02-01 RX ORDER — OMEPRAZOLE 40 MG/1
40 CAPSULE, DELAYED RELEASE ORAL
Qty: 90 CAPSULE | Refills: 1 | Status: SHIPPED | OUTPATIENT
Start: 2024-02-01

## 2024-02-01 NOTE — PROGRESS NOTES
Pulmonary Progress note           REASON FOR CONSULTATION:  Chief Complaint   Patient presents with    Follow-up        Consult at request of Leta Carroll DO     PCP: Leta Carroll DO        Assessment and Plan:   Diagnosis Orders   1. Right thyroid nodule  US THYROID              Plan:  Unexplained shortness of breath, normal PFT, 6 minute walk test and CXR  Improving slowly  Advised to increase exertion gradually   Suspect component of anxiety/deconditioning   Started on Airsupra QID for positive bronchodilator response,   US thyroid ordered, follow up with Dr. Carroll,  Omeprazole for GERD           HISTORY OF PRESENT ILLNESS: Rosalva Bunch is very pleasant 78 y.o. year old lady with medical history stated below significant for lifelong non-smoker no prior history of asthma or COPD was referred to us for shortness of breath with exertion.  She is short of breath with any moderate exertion and feels tired throughout the day, sometimes she feels short of breath even at rest and feeling that she is not having a full breath (?  Anxiety related) she denied any anxiety none on file  She denied any associated symptoms no chest pain no wheezing no chronic cough  No prior imaging or PFT  She underwent echocardiogram recently that showed no major abnormalities with normal ejection fraction mild mitral stenosis  History of snoring and daytime fatigue  Daughters with asthma  Mother non smoker with emphysema    Normal thyroid function normal hemoglobin  6 minute walk was without significant desaturation  PFT with positive bronchodilator response  Echo with normal EF and mild MS  CT with small nodules  Thryoid nodule  Sob improving        Past Medical History:   Diagnosis Date    Cyst of left kidney     H/O tooth extraction     Pseudogout     Scoliosis     Spinal arthritis        Past Surgical History:

## 2024-02-06 ENCOUNTER — HOSPITAL ENCOUNTER (OUTPATIENT)
Dept: ULTRASOUND IMAGING | Age: 79
Discharge: HOME | End: 2024-02-06
Payer: MEDICARE

## 2024-02-06 DIAGNOSIS — E04.1 RIGHT THYROID NODULE: ICD-10-CM

## 2024-02-06 PROCEDURE — 76536 US EXAM OF HEAD AND NECK: CPT

## 2024-02-15 DIAGNOSIS — E04.1 RIGHT THYROID NODULE: Primary | ICD-10-CM

## 2024-02-19 ENCOUNTER — HOSPITAL ENCOUNTER (OUTPATIENT)
Dept: ULTRASOUND IMAGING | Age: 79
Discharge: HOME | End: 2024-02-19
Attending: INTERNAL MEDICINE
Payer: MEDICARE

## 2024-02-19 DIAGNOSIS — E04.1 RIGHT THYROID NODULE: ICD-10-CM

## 2024-02-19 PROCEDURE — 60100 BIOPSY OF THYROID: CPT

## 2024-02-19 PROCEDURE — 88305 TISSUE EXAM BY PATHOLOGIST: CPT

## 2024-02-19 PROCEDURE — 88173 CYTOPATH EVAL FNA REPORT: CPT

## 2024-02-29 ENCOUNTER — TELEPHONE (OUTPATIENT)
Dept: FAMILY MEDICINE CLINIC | Age: 79
End: 2024-02-29

## 2024-02-29 ENCOUNTER — TELEPHONE (OUTPATIENT)
Dept: PULMONOLOGY | Age: 79
End: 2024-02-29

## 2024-02-29 NOTE — TELEPHONE ENCOUNTER
In my reading of report benign cells were seen, she will need to check with specialist too since it was ordered by specialist so further input, recommendations can be given

## 2024-02-29 NOTE — TELEPHONE ENCOUNTER
----- Message from Mima Mccormick sent at 2/29/2024  9:12 AM EST -----  Subject: Results Request    QUESTIONS  Results: Biopsy on thyroid; Ordered by: Zi Zimmerman   Date Performed: 2024-02-19  ---------------------------------------------------------------------------  --------------  CALL BACK INFO    [phone]; OK to leave message on voicemail  ---------------------------------------------------------------------------  --------------

## 2024-02-29 NOTE — TELEPHONE ENCOUNTER
Patient called to say that she had a biopsy on thyroid 2/19 and she hasn't heard of the results. Please call patient with results.

## 2024-03-05 ENCOUNTER — TELEPHONE (OUTPATIENT)
Dept: FAMILY MEDICINE CLINIC | Age: 79
End: 2024-03-05

## 2024-03-05 DIAGNOSIS — R06.09 DOE (DYSPNEA ON EXERTION): Primary | ICD-10-CM

## 2024-03-05 NOTE — TELEPHONE ENCOUNTER
Pt requesting referral/recommendation for cardiologist. Please advise. Please call pt back at [phone]

## 2024-03-06 NOTE — TELEPHONE ENCOUNTER
I sent a referral for Dr. Hitchcock for cardiology    [address] [facility] [address] [phone]

## 2024-03-06 NOTE — TELEPHONE ENCOUNTER
She saw Dr. Erickson previously. Is she wanting to establish with new cardiologist? Has she developed any new or worsening symptoms?

## 2024-03-06 NOTE — TELEPHONE ENCOUNTER
Called pt pt states she is having worse sob. And she has been coughing a lot. Pt states she wants a new cardiologist because she didn't feel like the last one did a very good job.

## 2024-03-28 RX ORDER — ALBUTEROL SULFATE 90 UG/1
2 AEROSOL, METERED RESPIRATORY (INHALATION) 4 TIMES DAILY PRN
Qty: 18 G | Refills: 0 | Status: SHIPPED | OUTPATIENT
Start: 2024-03-28

## 2024-03-28 RX ORDER — ALBUTEROL SULFATE AND BUDESONIDE 90; 80 UG/1; UG/1
2 AEROSOL, METERED RESPIRATORY (INHALATION) EVERY 6 HOURS PRN
Qty: 1 G | Refills: 5 | Status: SHIPPED | OUTPATIENT
Start: 2024-03-28

## 2024-03-28 NOTE — PROGRESS NOTES
Interventional Cardiology Consultation     Rosalva Bunch  1945    PCP: Leta Carroll DO  Referring Physician: Bety Azul APRN-CNP  Reason for Referral: dyspnea on exertion   Chief Complaint: \"my legs hurt\"  Chief Complaint   Patient presents with    New Patient     N/P - CC SOB       Subjective:   History of Present Illness: The patient is 78 y.o. female with a past medical history significant for LBBB and mitral valve disease. Patient previously followed with [facility] at [facility]. Completed a LHC 8/2021 which showed normal coronaries. Patient reports increased shortness of breath with exertion and at rest. Denies any chest pain. Patient also reports increased leg discomfort/pain. States this pain is worse with activity and at rest. Patient states she is afraid to walk long distances because she gets so out of breath that she worries she won't make it back to original destination. States she barely sleeps at night due to her legs cramping/aching. Denies any wounds.           Past Medical History:   Diagnosis Date    Cyst of left kidney     H/O tooth extraction     Pseudogout     Scoliosis     Spinal arthritis      Past Surgical History:   Procedure Laterality Date    APPENDECTOMY      CHOLECYSTECTOMY      PULMONARY FUNCTION TEST  10/31/2023    US THYROID BIOPSY  2/19/2024    US THYROID BIOPSY 2/19/2024 [facility] ULTRASOUND     Family History   Problem Relation Age of Onset    Hypertension Father      Social History     Tobacco Use    Smoking status: Never     Passive exposure: Never    Smokeless tobacco: Never   Vaping Use    Vaping Use: Never used   Substance Use Topics    Alcohol use: Never    Drug use: Never       Allergies   Allergen Reactions    Penicillins Hives       Current Outpatient Medications   Medication Sig Dispense Refill    Albuterol-Budesonide (AIRSUPRA) 90-80 MCG/ACT AERO Inhale 2 puffs into the lungs every 6 hours as needed (sob) 1 g 5    albuterol sulfate

## 2024-04-03 ENCOUNTER — OFFICE VISIT (OUTPATIENT)
Dept: CARDIOLOGY CLINIC | Age: 79
End: 2024-04-03
Payer: MEDICARE

## 2024-04-03 VITALS
HEIGHT: 65 IN | DIASTOLIC BLOOD PRESSURE: 102 MMHG | WEIGHT: 212 LBS | HEART RATE: 86 BPM | SYSTOLIC BLOOD PRESSURE: 140 MMHG | BODY MASS INDEX: 35.32 KG/M2 | OXYGEN SATURATION: 98 %

## 2024-04-03 DIAGNOSIS — I70.223 ATHEROSCLEROSIS OF NATIVE ARTERIES OF EXTREMITIES WITH REST PAIN, BILATERAL LEGS (HCC): ICD-10-CM

## 2024-04-03 DIAGNOSIS — I73.9 CLAUDICATION (HCC): ICD-10-CM

## 2024-04-03 DIAGNOSIS — R06.02 SOB (SHORTNESS OF BREATH): Primary | ICD-10-CM

## 2024-04-03 PROCEDURE — 1123F ACP DISCUSS/DSCN MKR DOCD: CPT | Performed by: INTERNAL MEDICINE

## 2024-04-03 PROCEDURE — 99204 OFFICE O/P NEW MOD 45 MIN: CPT | Performed by: INTERNAL MEDICINE

## 2024-04-03 PROCEDURE — 1090F PRES/ABSN URINE INCON ASSESS: CPT | Performed by: INTERNAL MEDICINE

## 2024-04-03 PROCEDURE — 1036F TOBACCO NON-USER: CPT | Performed by: INTERNAL MEDICINE

## 2024-04-03 PROCEDURE — G8427 DOCREV CUR MEDS BY ELIG CLIN: HCPCS | Performed by: INTERNAL MEDICINE

## 2024-04-03 PROCEDURE — G8400 PT W/DXA NO RESULTS DOC: HCPCS | Performed by: INTERNAL MEDICINE

## 2024-04-03 PROCEDURE — G8417 CALC BMI ABV UP PARAM F/U: HCPCS | Performed by: INTERNAL MEDICINE

## 2024-04-03 PROCEDURE — 93000 ELECTROCARDIOGRAM COMPLETE: CPT | Performed by: INTERNAL MEDICINE

## 2024-04-15 ENCOUNTER — HOSPITAL ENCOUNTER (OUTPATIENT)
Dept: CT IMAGING | Age: 79
Discharge: HOME | End: 2024-04-15
Attending: INTERNAL MEDICINE
Payer: MEDICARE

## 2024-04-15 DIAGNOSIS — I70.223 ATHEROSCLEROSIS OF NATIVE ARTERIES OF EXTREMITIES WITH REST PAIN, BILATERAL LEGS (HCC): ICD-10-CM

## 2024-04-15 DIAGNOSIS — I73.9 CLAUDICATION (HCC): ICD-10-CM

## 2024-04-15 DIAGNOSIS — R06.02 SOB (SHORTNESS OF BREATH): ICD-10-CM

## 2024-04-15 LAB
PERFORMED ON: NORMAL
POC CREATININE: 0.9 MG/DL (ref 0.6–1.2)
POC SAMPLE TYPE: NORMAL

## 2024-04-15 PROCEDURE — 6360000004 HC RX CONTRAST MEDICATION: Performed by: INTERNAL MEDICINE

## 2024-04-15 PROCEDURE — 75635 CT ANGIO ABDOMINAL ARTERIES: CPT

## 2024-04-15 PROCEDURE — 82565 ASSAY OF CREATININE: CPT

## 2024-04-15 RX ADMIN — IOPAMIDOL 75 ML: 755 INJECTION, SOLUTION INTRAVENOUS at 15:28

## 2024-04-25 RX ORDER — ALBUTEROL SULFATE 90 UG/1
AEROSOL, METERED RESPIRATORY (INHALATION)
Qty: 18 G | Refills: 5 | Status: SHIPPED | OUTPATIENT
Start: 2024-04-25

## 2024-05-20 RX ORDER — OMEPRAZOLE 40 MG/1
40 CAPSULE, DELAYED RELEASE ORAL
Qty: 90 CAPSULE | Refills: 1 | Status: SHIPPED | OUTPATIENT
Start: 2024-05-20

## 2024-06-10 ENCOUNTER — TELEPHONE (OUTPATIENT)
Dept: PULMONOLOGY | Age: 79
End: 2024-06-10

## 2024-08-19 ENCOUNTER — TELEPHONE (OUTPATIENT)
Dept: FAMILY MEDICINE CLINIC | Age: 79
End: 2024-08-19